# Patient Record
Sex: FEMALE | Race: BLACK OR AFRICAN AMERICAN | NOT HISPANIC OR LATINO | URBAN - METROPOLITAN AREA
[De-identification: names, ages, dates, MRNs, and addresses within clinical notes are randomized per-mention and may not be internally consistent; named-entity substitution may affect disease eponyms.]

---

## 2017-09-21 ENCOUNTER — TELEPHONE (OUTPATIENT)
Dept: MEDICAL GROUP | Facility: MEDICAL CENTER | Age: 67
End: 2017-09-21

## 2017-09-21 NOTE — TELEPHONE ENCOUNTER
ANNUAL WELLNESS VISIT PRE-VISIT PLANNING     1.  Reviewed note from last office visit with PCP: YES    2.  If any orders were placed at last visit, do we have Results/Consult Notes?        •  Labs - Labs were not ordered at last office visit.       •  Imaging - Imaging was not ordered at last office visit.       •  Referrals -

## 2017-09-21 NOTE — TELEPHONE ENCOUNTER
Left message for patient to call back regarding pre-visit planning. Please transfer call to [phone].

## 2017-09-28 ENCOUNTER — OFFICE VISIT (OUTPATIENT)
Dept: MEDICAL GROUP | Facility: MEDICAL CENTER | Age: 67
End: 2017-09-28
Payer: MEDICARE

## 2017-09-28 VITALS
HEART RATE: 70 BPM | HEIGHT: 67 IN | SYSTOLIC BLOOD PRESSURE: 112 MMHG | TEMPERATURE: 98 F | OXYGEN SATURATION: 94 % | BODY MASS INDEX: 27.65 KG/M2 | WEIGHT: 176.2 LBS | DIASTOLIC BLOOD PRESSURE: 76 MMHG

## 2017-09-28 DIAGNOSIS — E55.9 HYPOVITAMINOSIS D: ICD-10-CM

## 2017-09-28 DIAGNOSIS — Z12.11 COLON CANCER SCREENING: ICD-10-CM

## 2017-09-28 DIAGNOSIS — E78.5 DYSLIPIDEMIA: Chronic | ICD-10-CM

## 2017-09-28 DIAGNOSIS — I10 ESSENTIAL HYPERTENSION: Chronic | ICD-10-CM

## 2017-09-28 DIAGNOSIS — Z00.00 PREVENTATIVE HEALTH CARE: Chronic | ICD-10-CM

## 2017-09-28 DIAGNOSIS — Z00.00 MEDICARE ANNUAL WELLNESS VISIT, SUBSEQUENT: ICD-10-CM

## 2017-09-28 DIAGNOSIS — D12.6 TUBULAR ADENOMA OF COLON: ICD-10-CM

## 2017-09-28 PROCEDURE — G0439 PPPS, SUBSEQ VISIT: HCPCS | Performed by: INTERNAL MEDICINE

## 2017-09-28 RX ORDER — RAMIPRIL 10 MG/1
10 CAPSULE ORAL 2 TIMES DAILY
Qty: 180 CAP | Refills: 3 | Status: SHIPPED | OUTPATIENT
Start: 2017-09-28 | End: 2018-10-04 | Stop reason: SDUPTHER

## 2017-09-28 RX ORDER — ERGOCALCIFEROL 1.25 MG/1
50000 CAPSULE ORAL
Qty: 12 CAP | Refills: 3 | Status: SHIPPED | OUTPATIENT
Start: 2017-09-28 | End: 2018-10-04 | Stop reason: SDUPTHER

## 2017-09-28 RX ORDER — HYDROCHLOROTHIAZIDE 12.5 MG/1
12.5 CAPSULE, GELATIN COATED ORAL DAILY
Qty: 90 CAP | Refills: 3 | Status: SHIPPED | OUTPATIENT
Start: 2017-09-28 | End: 2018-10-04 | Stop reason: SDUPTHER

## 2017-09-28 RX ORDER — ATORVASTATIN CALCIUM 20 MG/1
20 TABLET, FILM COATED ORAL DAILY
Qty: 90 TAB | Refills: 3 | Status: SHIPPED | OUTPATIENT
Start: 2017-09-28 | End: 2018-10-04 | Stop reason: SDUPTHER

## 2017-09-28 ASSESSMENT — PATIENT HEALTH QUESTIONNAIRE - PHQ9: CLINICAL INTERPRETATION OF PHQ2 SCORE: 0

## 2017-09-28 NOTE — LETTER
[facility]  Adeel Sepulveda M.D.  [address]  [place] [address]  Fax: [phone]   Authorization for Release/Disclosure of   Protected Health Information   Name: SANDEE MARCUS : 1950 SSN: [id number]   Address: [address] [place] [address]  [place] [address] Phone:    [phone] (home)    I authorize the entity listed below to release/disclose the PHI below to:   [facility]/Adeel Sepulveda M.D. and Adeel Sepulveda M.D.   Provider or Entity Name:     Address   City, State, Zip   Phone:      Fax:     Reason for request: continuity of care   Information to be released:    [  ] LAST COLONOSCOPY,  including any PATH REPORT and follow-up  [  ] LAST FIT/COLOGUARD RESULT [  ] LAST DEXA  [ xxxx] LAST MAMMOGRAM  [xxx] LAST PAP  [  ] LAST LABS [  ] RETINA EXAM REPORT  [  ] IMMUNIZATION RECORDS  [  ] Release all info      [  ] Check here and initial the line next to each item to release ALL health information INCLUDING  _____ Care and treatment for drug and / or alcohol abuse  _____ HIV testing, infection status, or AIDS  _____ Genetic Testing    DATES OF SERVICE OR TIME PERIOD TO BE DISCLOSED: _____________  I understand and acknowledge that:  * This Authorization may be revoked at any time by you in writing, except if your health information has already been used or disclosed.  * Your health information that will be used or disclosed as a result of you signing this authorization could be re-disclosed by the recipient. If this occurs, your re-disclosed health information may no longer be protected by State or Federal laws.  * You may refuse to sign this Authorization. Your refusal will not affect your ability to obtain treatment.  * This Authorization becomes effective upon signing and will  on (date) __________.      If no date is indicated, this Authorization will  one (1) year from the signature date.    Name: Sandee Marcus    Signature:   Date:     2017            PLEASE FAX REQUESTED RECORDS BACK TO: [phone]

## 2017-09-28 NOTE — PROGRESS NOTES
No chief complaint on file.        HPI:  Sandee is a 67 y.o. here for Medicare Annual Wellness Visit  meds and allergies reviewed  Sees  eye  Sees gyn in [facility]  note reviewed  No tobacco, no etoh  No mood changes or depression  SH no tobacco, no etoh,       Patient Active Problem List    Diagnosis Date Noted   • Tubular adenoma of colon 11/01/2013   • Tear meniscus knee 08/16/2012   • Hypertension 08/22/2009   • Dyslipidemia 08/22/2009   • Family history of breast cancer 08/22/2009   • Preventative health care 08/22/2009       Current Outpatient Prescriptions   Medication Sig Dispense Refill   • hydrochlorothiazide (MICROZIDE) 12.5 MG capsule Take 1 Cap by mouth every day. 90 Cap 3   • ramipril (ALTACE) 10 MG capsule Take 1 Cap by mouth 2 Times a Day. 180 Cap 3   • atorvastatin (LIPITOR) 20 MG Tab Take 1 Tab by mouth every day. 90 Tab 3   • ergocalciferol (DRISDOL) 04828 UNIT capsule Take 1 Cap by mouth every 7 days. 12 Cap 3   • aspirin (ASA) 81 MG CHEW Take 1 Tab by mouth every day. 100 Tab 11     No current facility-administered medications for this visit.         Patient is taking medications as noted in medication list.  Current supplements as per medication list.     Allergies: Erythromycin and Norvasc [amlodipine besylate]    Current social contact/activities: pt keeps herself busy with daily activities.       Is patient current with immunizations?  Flu need  She  reports that she has never smoked. She has never used smokeless tobacco. She reports that she drinks alcohol. She reports that she does not use drugs.  Counseling given: Not Answered        DPA/Advanced directive: Patient does not have an Advanced Directive.  A packet and workshop information was given on Advanced Directives.    ROS:    Gait: Uses no assistive device   Ostomy: no    Other tubes: no    Amputations: no    Chronic oxygen use no    Last eye exam last year     Wears hearing aids: no    :  Denies incontinence.             Depression Screening    Little interest or pleasure in doing things?  0 - not at all  Feeling down, depressed, or hopeless? 0 - not at all  Patient Health Questionnaire Score: 0      Screening for Cognitive Impairment    Three Minute Recall (apple, watch, peter)  3/3    Draw clock face with all 12 numbers set to the hand to show 10 minutes past 11 o'clock  1 5/5  If cognitive concerns identified, deferred for follow up unless specifically addressed in assessment and plan.    Fall Risk Assessment    Has the patient had two or more falls in the last year or any fall with injury in the last year?  No  If fall risk identified, deferred for follow up unless specifically addressed in assessment and plan.    Safety Assessment    Throw rugs on floor.  No  Handrails on all stairs.  Yes  Good lighting in all hallways.  Yes  Difficulty hearing.  No  Patient counseled about all safety risks that were identified.    Functional Assessment ADLs    Are there any barriers preventing you from cooking for yourself or meeting nutritional needs?  No.    Are there any barriers preventing you from driving safely or obtaining transportation?  No.    Are there any barriers preventing you from using a telephone or calling for help?  No.    Are there any barriers preventing you from shopping?  No.    Are there any barriers preventing you from taking care of your own finances?  No.    Are there any barriers preventing you from managing your medications?  No.    Are you currently engaging any exercise or physical activity?  Yes.  Pt works out at home.     Health Maintenance Summary                IMM ZOSTER VACCINE Overdue 9/3/2009     BONE DENSITY Overdue 9/3/2014     Annual Wellness Visit Overdue 9/22/2016      Done 9/22/2015     PAP SMEAR Overdue 8/14/2017      Done 8/14/2014 done in [facility]     Patient has more history with this topic...    MAMMOGRAM Overdue 8/18/2017      Done 8/18/2016  [id number]     Patient has more history with this topic...    IMM INFLUENZA Overdue 9/1/2017      Done 10/7/2016 Imm Admin: Influenza Vaccine Adult HD     Patient has more history with this topic...    COLONOSCOPY Next Due 10/29/2018      Done 10/29/2013 AMB REFERRAL TO GI FOR COLONOSCOPY    IMM DTaP/Tdap/Td Vaccine Next Due 8/16/2022      Done 8/16/2012 Imm Admin: Tdap Vaccine          Patient Care Team:  Adeel Sepulveda M.D. as PCP - General  Blade Verduzco M.D. as Consulting Physician (Ophthalmology)  Helene Fox M.D. as Consulting Physician (OB/Gyn)    Social History   Substance Use Topics   • Smoking status: Never Smoker   • Smokeless tobacco: Never Used   • Alcohol use 0.0 oz/week      Comment: rare     Family History   Problem Relation Age of Onset   • Cancer Mother      breast cancer   • Diabetes Brother      She  has a past medical history of Dyslipidemia (8/22/2009); Family history of breast cancer (8/22/2009); and Hypertension (8/22/2009).   No past surgical history on file.        Tubular adenoma  10/29/13 colon per [facility] tubular adenoma, repeat in 5 yrs     Meniscus  7/12 MRI left knee  medial meniscal posterior horn/body tear      Preventive health  2011 dexa in [place] no records  8/12 tdap   8/7/13 pap per gyn  10/29/13 colon per [facility] tubular adenoma, repeat in 5 yrs  9/16/14 pneumovax   10/1/15 declines zoster vaccine  10/15/15 prevnar at [facility]  8/18/16 mammogram at [place] per  gyn in [place], right breast nodular asymmetry, further evaluation with ultrasound is recommended  9/19/16 had follow up ultrasound and biopsy negative right breast in [place] no records  dexa per gyn in [place]  9/24/16 vit d 54 on [id number] weekly  9/28/16 FIT negative  8/31/17 pap per gyn  in [place], vaginal atrophy, urethral carbuncle, cystocele, rectocele, hemorrhoid, Pap smear performed     Hypertension  1/06 exercise thallium no evidence of ischemia ejection fraction  80%  1/06 echocardiogram normal left ventricular function  8/11 on altace 10 bid, add norvasc 5 mg  7/12 stopped norvasc due to leg swelling, on altace 10 mg bid  8/12 urine mac 4.8; on altace 10 mg bid and start hctz 12.5 mg   10/6/15 urine mac <5.8 on altace 10 mg bid and hctz 12.5 mg  9/24/16 urine mac 3.5 on altace 10 mg bid and hctz 12.5 mg  (intolerant norvasc)     Family history of breast cancer  Breast cancer in mother  Patient sees gynecologist in [place] patient for annual exam and mammogram   7/11 gyn note   7/11 mammogram per gyn in [place]  8/12 mammogram in [place] negative  8/7/13 mammogram in [place]  8/16/14 mammogram done in [facility]  8/14/15 mammogram at [place] in [place] per gyn  negative  8/18/16 mammogram at [place] per  gyn in [place], right breast nodular asymmetry, further evaluation with ultrasound is recommended     Dyslipidemia  12/08 chol 162, trig 192,hdl 48,ldl 76, CRP 1  2/10 chol 143,trig 154,hdl 45,ldl 67 on lipitor 40 mg  8/11 chol 148,trig 141,hdl 52,ldl 68 on lipitor 40 mg  8/12 chol 122,trig 136,hdl 40,ldl 55, decrease lipitor to 20 mg (40 mg 1/2 per day)  9/26/13 chol 142,trig 124,hdl 50,ldl 67 on lipitor 20 mg (1/2 40 mg tab)  9/16/14 off lipitor no side effects, just wanted to stop the lipitor  9/18/14 chol 229,trig 140,hdl 46,ldl 155 off lipitor; resume lipitor 20 mg daily repeat lipid in 6 weeks  10/16/15 chol 146,trig 160,hdl 51,ldl 63 on lipitor 20 mg  9/24/16 chol 152,trig 167,hdl 51,ldl 68 on lipitor 20 mg         Exam:        Hearing fair  Dentition fair  Alert, oriented in no acute distress.  Eye contact is good, speech goal directed, affect calm  Lungs clear  Cv s1s2 reg  Ext no edema        Assessment and Plan. The following treatment and monitoring plan is recommended:     Assessment  #! Wellness    #2 Tubular adenoma last colonoscopy 10/29/13 colon per [facility] tubular adenoma, repeat in 5 yrs     #3 history of knee meniscus tear 7/12  MRI left knee  medial meniscal posterior horn/body tear , no current knee pain     #4 Hypertension stable on altace 10 mg bid and hctz 12.5 mg no lightheadedness or dizziness      #5 Dyslipidemia 9/24/16 chol 152,trig 167,hdl 51,ldl 68 on lipitor 20 mg, stable no muscle pain      #6 Preventive health  2011 dexa in [place] no records  8/12 tdap   8/7/13 pap per gyn  10/29/13 colon per [facility] tubular adenoma, repeat in 5 yrs  9/16/14 pneumovax   10/1/15 declines zoster vaccine  10/15/15 prevnar at [facility]  8/18/16 mammogram at [place] per  gyn in [place], right breast nodular asymmetry, further evaluation with ultrasound is recommended  9/19/16 had follow up ultrasound and biopsy negative right breast in [place] no records  dexa per gyn in [place]  9/24/16 vit d 54 on [id number] weekly  9/28/16 FIT negative  8/31/17 pap per gyn  in [place], vaginal atrophy, urethral carbuncle, cystocele, rectocele, hemorrhoid, Pap smear performed    #7 low vitamin D         Services suggested:    Health Care Screening recommendations as per orders if indicated.  Referrals offered: PT/OT/Nutrition counseling/Behavioral Health/Smoking cessation as per orders if indicated.    Discussion today about general wellness and lifestyle habits:    · Prevent falls and reduce trip hazards; Cautioned about securing or removing rugs.  · Have a working fire alarm and carbon monoxide detector;   · Engage in regular physical activity and social activities       Follow-up:   Plan  #! FIT card and instructions provided    #2 labs ordered      #3 had mammogram and dexa done by gyn     #4 sees gyn [place] for pap at [place] try to obtain old records     #5 health maintenance reviewed and updated    #6 she will get flu vaccine at pharmacy    #7 declines zoster vaccine    #8 declines physical therapy    #9 declines hearing test    #10 has had pneumovax and prevnar and tdap    #11 old dexa records from [place]     #12 had  mammogram 8/31/17

## 2017-09-30 ENCOUNTER — TELEPHONE (OUTPATIENT)
Dept: MEDICAL GROUP | Facility: MEDICAL CENTER | Age: 67
End: 2017-09-30

## 2017-09-30 LAB
25(OH)D3+25(OH)D2 SERPL-MCNC: 51 NG/ML (ref 30–100)
ALBUMIN SERPL-MCNC: 3.9 G/DL (ref 3.6–4.8)
ALBUMIN/CREAT UR: <2.7 MG/G CREAT (ref 0–30)
ALBUMIN/GLOB SERPL: 1.1 {RATIO} (ref 1.2–2.2)
ALP SERPL-CCNC: 88 IU/L (ref 39–117)
ALT SERPL-CCNC: 20 IU/L (ref 0–32)
APPEARANCE UR: ABNORMAL
AST SERPL-CCNC: 23 IU/L (ref 0–40)
BACTERIA #/AREA URNS HPF: NORMAL /[HPF]
BACTERIA UR CULT: NO GROWTH
BACTERIA UR CULT: NORMAL
BASOPHILS # BLD AUTO: 0 X10E3/UL (ref 0–0.2)
BASOPHILS NFR BLD AUTO: 0 %
BILIRUB SERPL-MCNC: 0.5 MG/DL (ref 0–1.2)
BILIRUB UR QL STRIP: NEGATIVE
BUN SERPL-MCNC: 13 MG/DL (ref 8–27)
BUN/CREAT SERPL: 14 (ref 12–28)
CALCIUM SERPL-MCNC: 9.7 MG/DL (ref 8.7–10.3)
CASTS URNS MICRO: NORMAL
CASTS URNS QL MICRO: NORMAL
CHLORIDE SERPL-SCNC: 100 MMOL/L (ref 96–106)
CHOLEST SERPL-MCNC: 144 MG/DL (ref 100–199)
CO2 SERPL-SCNC: 25 MMOL/L (ref 18–29)
COLOR UR: YELLOW
COMMENT 011824: NORMAL
CREAT SERPL-MCNC: 0.92 MG/DL (ref 0.57–1)
CREAT UR-MCNC: 109.4 MG/DL
CRYSTALS URNS MICRO: NORMAL
EOSINOPHIL # BLD AUTO: 0.2 X10E3/UL (ref 0–0.4)
EOSINOPHIL NFR BLD AUTO: 3 %
EPI CELLS #/AREA URNS HPF: NORMAL /HPF
ERYTHROCYTE [DISTWIDTH] IN BLOOD BY AUTOMATED COUNT: 13.6 % (ref 12.3–15.4)
GLOBULIN SER CALC-MCNC: 3.5 G/DL (ref 1.5–4.5)
GLUCOSE SERPL-MCNC: 84 MG/DL (ref 65–99)
GLUCOSE UR QL: NEGATIVE
HBA1C MFR BLD: 5.9 % (ref 4.8–5.6)
HCT VFR BLD AUTO: 38.2 % (ref 34–46.6)
HDLC SERPL-MCNC: 52 MG/DL
HGB BLD-MCNC: 12.9 G/DL (ref 11.1–15.9)
HGB UR QL STRIP: NEGATIVE
IMM GRANULOCYTES # BLD: 0 X10E3/UL (ref 0–0.1)
IMM GRANULOCYTES NFR BLD: 0 %
IMMATURE CELLS  115398: NORMAL
KETONES UR QL STRIP: NEGATIVE
LDLC SERPL CALC-MCNC: 64 MG/DL (ref 0–99)
LEUKOCYTE ESTERASE UR QL STRIP: ABNORMAL
LYMPHOCYTES # BLD AUTO: 1.7 X10E3/UL (ref 0.7–3.1)
LYMPHOCYTES NFR BLD AUTO: 28 %
MCH RBC QN AUTO: 28.7 PG (ref 26.6–33)
MCHC RBC AUTO-ENTMCNC: 33.8 G/DL (ref 31.5–35.7)
MCV RBC AUTO: 85 FL (ref 79–97)
MICRO URNS: ABNORMAL
MICRO URNS: ABNORMAL
MICROALBUMIN UR-MCNC: <3 UG/ML
MONOCYTES # BLD AUTO: 0.5 X10E3/UL (ref 0.1–0.9)
MONOCYTES NFR BLD AUTO: 9 %
MORPHOLOGY BLD-IMP: NORMAL
MUCOUS THREADS URNS QL MICRO: PRESENT
NEUTROPHILS # BLD AUTO: 3.5 X10E3/UL (ref 1.4–7)
NEUTROPHILS NFR BLD AUTO: 60 %
NITRITE UR QL STRIP: NEGATIVE
NRBC BLD AUTO-RTO: NORMAL %
PH UR STRIP: 6 [PH] (ref 5–7.5)
PLATELET # BLD AUTO: 270 X10E3/UL (ref 150–379)
POTASSIUM SERPL-SCNC: 4.2 MMOL/L (ref 3.5–5.2)
PROT SERPL-MCNC: 7.4 G/DL (ref 6–8.5)
PROT UR QL STRIP: NEGATIVE
RBC # BLD AUTO: 4.5 X10E6/UL (ref 3.77–5.28)
RBC #/AREA URNS HPF: NORMAL /HPF
RENAL EPI CELLS #/AREA URNS HPF: NORMAL /[HPF]
SODIUM SERPL-SCNC: 139 MMOL/L (ref 134–144)
SP GR UR: 1.02 (ref 1–1.03)
T VAGINALIS URNS QL MICRO: NORMAL
TRIGL SERPL-MCNC: 138 MG/DL (ref 0–149)
TSH SERPL DL<=0.005 MIU/L-ACNC: 1.81 UIU/ML (ref 0.45–4.5)
UNIDENT CRYS URNS QL MICRO: NORMAL
URINALYSIS REFLEX  377202: ABNORMAL
URNS CMNT MICRO: NORMAL
UROBILINOGEN UR STRIP-MCNC: 0.2 MG/DL (ref 0.2–1)
VLDLC SERPL CALC-MCNC: 28 MG/DL (ref 5–40)
WBC # BLD AUTO: 5.9 X10E3/UL (ref 3.4–10.8)
WBC #/AREA URNS HPF: NORMAL /HPF
YEAST #/AREA URNS HPF: NORMAL /[HPF]

## 2017-10-03 LAB — HEMOCCULT STL QL IA: NEGATIVE

## 2018-10-04 ENCOUNTER — TELEPHONE (OUTPATIENT)
Dept: MEDICAL GROUP | Facility: MEDICAL CENTER | Age: 68
End: 2018-10-04

## 2018-10-04 ENCOUNTER — OFFICE VISIT (OUTPATIENT)
Dept: MEDICAL GROUP | Facility: MEDICAL CENTER | Age: 68
End: 2018-10-04
Payer: MEDICARE

## 2018-10-04 VITALS
SYSTOLIC BLOOD PRESSURE: 126 MMHG | BODY MASS INDEX: 28.45 KG/M2 | TEMPERATURE: 98.3 F | HEART RATE: 83 BPM | HEIGHT: 66 IN | WEIGHT: 177 LBS | DIASTOLIC BLOOD PRESSURE: 74 MMHG | OXYGEN SATURATION: 98 %

## 2018-10-04 DIAGNOSIS — R79.89 LOW VITAMIN D LEVEL: ICD-10-CM

## 2018-10-04 DIAGNOSIS — E55.9 HYPOVITAMINOSIS D: ICD-10-CM

## 2018-10-04 DIAGNOSIS — Z12.11 COLON CANCER SCREENING: ICD-10-CM

## 2018-10-04 DIAGNOSIS — Z12.31 ENCOUNTER FOR SCREENING MAMMOGRAM FOR BREAST CANCER: ICD-10-CM

## 2018-10-04 DIAGNOSIS — M81.0 POSTMENOPAUSAL BONE LOSS: ICD-10-CM

## 2018-10-04 DIAGNOSIS — Z00.00 PREVENTATIVE HEALTH CARE: Chronic | ICD-10-CM

## 2018-10-04 DIAGNOSIS — R73.09 IMPAIRED GLUCOSE METABOLISM: ICD-10-CM

## 2018-10-04 DIAGNOSIS — E78.5 DYSLIPIDEMIA: Chronic | ICD-10-CM

## 2018-10-04 DIAGNOSIS — I10 ESSENTIAL HYPERTENSION: Chronic | ICD-10-CM

## 2018-10-04 DIAGNOSIS — Z00.00 MEDICARE ANNUAL WELLNESS VISIT, SUBSEQUENT: ICD-10-CM

## 2018-10-04 PROCEDURE — G0439 PPPS, SUBSEQ VISIT: HCPCS | Performed by: INTERNAL MEDICINE

## 2018-10-04 RX ORDER — HYDROCHLOROTHIAZIDE 12.5 MG/1
12.5 CAPSULE, GELATIN COATED ORAL DAILY
Qty: 90 CAP | Refills: 3 | Status: SHIPPED | OUTPATIENT
Start: 2018-10-04 | End: 2019-10-14 | Stop reason: SDUPTHER

## 2018-10-04 RX ORDER — ATORVASTATIN CALCIUM 20 MG/1
20 TABLET, FILM COATED ORAL DAILY
Qty: 90 TAB | Refills: 3 | Status: SHIPPED | OUTPATIENT
Start: 2018-10-04 | End: 2019-10-14 | Stop reason: SDUPTHER

## 2018-10-04 RX ORDER — RAMIPRIL 10 MG/1
10 CAPSULE ORAL 2 TIMES DAILY
Qty: 180 CAP | Refills: 3 | Status: SHIPPED | OUTPATIENT
Start: 2018-10-04 | End: 2019-10-14 | Stop reason: SDUPTHER

## 2018-10-04 RX ORDER — ERGOCALCIFEROL 1.25 MG/1
50000 CAPSULE ORAL
Qty: 12 CAP | Refills: 3 | Status: SHIPPED | OUTPATIENT
Start: 2018-10-04 | End: 2020-10-23

## 2018-10-04 ASSESSMENT — ACTIVITIES OF DAILY LIVING (ADL): BATHING_REQUIRES_ASSISTANCE: 0

## 2018-10-04 ASSESSMENT — ENCOUNTER SYMPTOMS: GENERAL WELL-BEING: EXCELLENT

## 2018-10-04 ASSESSMENT — PATIENT HEALTH QUESTIONNAIRE - PHQ9: CLINICAL INTERPRETATION OF PHQ2 SCORE: 0

## 2018-10-04 NOTE — PROGRESS NOTES
Subjective:      Sandee Grimm is a 68 y.o. female who presents wellness assessment          HPI     CC:  Health Risk Assessment Exam.  HPI: Wellness assessment  Sandee is a 68 y.o. here for Health Risk Assessment.   PCP: Adeel Sepulveda M.D.  Other specialists: sees gynecology in [place]   Ophthal: no recent eye exam   SH , no tobacco, no etoh, video cardio work out five days per week 30 minutes, tries to eat clean avoids processed foods   FH reviewed one brother head and neck cancer       Patient Active Problem List    Diagnosis Date Noted   • Tubular adenoma of colon 11/01/2013   • Tear meniscus knee 08/16/2012   • Hypertension 08/22/2009   • Dyslipidemia 08/22/2009   • Family history of breast cancer 08/22/2009   • Preventative health care 08/22/2009     Current Outpatient Prescriptions   Medication Sig Dispense Refill   • hydrochlorothiazide (MICROZIDE) 12.5 MG capsule Take 1 Cap by mouth every day. 90 Cap 3   • ramipril (ALTACE) 10 MG capsule Take 1 Cap by mouth 2 Times a Day. 180 Cap 3   • atorvastatin (LIPITOR) 20 MG Tab Take 1 Tab by mouth every day. 90 Tab 3   • ergocalciferol (DRISDOL) 59186 UNIT capsule Take 1 Cap by mouth every 7 days. 12 Cap 3   • aspirin (ASA) 81 MG CHEW Take 1 Tab by mouth every day. 100 Tab 11     No current facility-administered medications for this visit.       Current supplements: reviewed  Chronic narcotic pain medicines: no  Allergies: Erythromycin and Norvasc [amlodipine besylate]    Screening:reviewed  Depressive Symptoms: Denies feeling down, depressed or hopeless. Denies loss of interest or pleasure in doing things   ADLs: Denies needing help with using telephone, transportation, shopping, preparing meals, housework, laundry, or managing medication or money.    Independent with bathing, hygiene, feeding, toileting, dressing    Memory concerns: Denies difficulty remembering details of conversations, events and upcoming appointments.  Hearing problems:  Denies.   Recent falls no    Current social contact/activities: reviewed  Social History   Substance Use Topics   • Smoking status: Never Smoker   • Smokeless tobacco: Never Used   • Alcohol use 0.0 oz/week      Comment: rare       Family History   Problem Relation Age of Onset   • Cancer Mother         breast cancer   • Diabetes Brother      History reviewed. No pertinent surgical history.    ostomy or other tubes or amputations: no  Chronic oxygen use no   Gait: normal. Uses assistive device : no  Health Care Screening recommendations reviewed with patient today and updated or ordered.  DPA/Advanced directive: Completed/Information provided.    Referrals for PT/OT/Nutrition counseling/Behavioral Health/Smoking cessation as above if indicated  Discussion today about general wellness and lifestyle habits:    · Prevent falls and reduce trip hazards;   · Have a working fire alarm and carbon monoxide detector;   · Engage in regular physical activity and social activities;   · Use sun protection when outdoors.       Current Outpatient Prescriptions   Medication Sig Dispense Refill   • hydrochlorothiazide (MICROZIDE) 12.5 MG capsule Take 1 Cap by mouth every day. 90 Cap 3   • ramipril (ALTACE) 10 MG capsule Take 1 Cap by mouth 2 Times a Day. 180 Cap 3   • atorvastatin (LIPITOR) 20 MG Tab Take 1 Tab by mouth every day. 90 Tab 3   • ergocalciferol (DRISDOL) 73935 UNIT capsule Take 1 Cap by mouth every 7 days. 12 Cap 3   • aspirin (ASA) 81 MG CHEW Take 1 Tab by mouth every day. 100 Tab 11     No current facility-administered medications for this visit.      Tubular adenoma  10/29/13 colon per [facility] tubular adenoma, repeat in 5 yrs     Meniscus  7/12 MRI left knee  medial meniscal posterior horn/body tear      Preventive health  2011 dexa in [place] no records  8/12 tdap   10/29/13 colon per [facility] tubular adenoma, repeat in 5 yrs  9/16/14 pneumovax   10[facility]  8/31/17 pap per gyn  in [place],  vaginal atrophy, urethral carbuncle, cystocele, rectocele, hemorrhoid, Pap smear performed  8/31/17 mammogram at [place]  9/28/17 declines zoster  9/30/17 vit d 51 on [id number] weekly  10/3/17 FIT negative  dexa by gyn at [place]     Hypertension  1/06 exercise thallium no evidence of ischemia ejection fraction 80%  1/06 echocardiogram normal left ventricular function  8/11 on altace 10 bid, add norvasc 5 mg  7/12 stopped norvasc due to leg swelling, on altace 10 mg bid  8/12 urine mac 4.8; on altace 10 mg bid and start hctz 12.5 mg   10/6/15 urine mac <5.8 on altace 10 mg bid and hctz 12.5 mg  9/24/16 urine mac 3.5 on altace 10 mg bid and hctz 12.5 mg  9/30/17 urine mac <2.7 on altace 10 mg bid and hctz 12.5 mg  (intolerant norvasc)     Family history of breast cancer  Breast cancer in mother  Patient sees gynecologist in [place] patient for annual exam and mammogram   7/11 gyn note   7/11 mammogram per gyn in [place]  8/12 mammogram in [place] negative  8/7/13 mammogram in [place]  8/16/14 mammogram done in [facility]  8/14/15 mammogram at [place] in [place] per gyn  negative  8/18/16 mammogram at [place] per  gyn in [place], right breast nodular asymmetry, further evaluation with ultrasound is recommended     Dyslipidemia  12/08 chol 162, trig 192,hdl 48,ldl 76, CRP 1  2/10 chol 143,trig 154,hdl 45,ldl 67 on lipitor 40 mg  8/11 chol 148,trig 141,hdl 52,ldl 68 on lipitor 40 mg  8/12 chol 122,trig 136,hdl 40,ldl 55, decrease lipitor to 20 mg (40 mg 1/2 per day)  9/26/13 chol 142,trig 124,hdl 50,ldl 67 on lipitor 20 mg (1/2 40 mg tab)  9/16/14 off lipitor no side effects, just wanted to stop the lipitor  9/18/14 chol 229,trig 140,hdl 46,ldl 155 off lipitor; resume lipitor 20 mg daily repeat lipid in 6 weeks  10/16/15 chol 146,trig 160,hdl 51,ldl 63 on lipitor 20 mg  9/24/16 chol 152,trig 167,hdl 51,ldl 68 on lipitor 20 mg  9/30/17 chol 144,trig 138,hdl 52,ldl 64 on lipitor 20 mg  Patient  Active Problem List   Diagnosis   • Hypertension   • Dyslipidemia   • Family history of breast cancer   • Preventative health care   • Tear meniscus knee   • Tubular adenoma of colon       Depression Screening    Little interest or pleasure in doing things?  0 - not at all  Feeling down, depressed , or hopeless? 0 - not at all  Patient Health Questionnaire Score: 0     If depressive symptoms identified deferred to follow up visit unless specifically addressed in assessment and plan.    Interpretation of PHQ-9 Total Score   Score Severity   1-4 No Depression   5-9 Mild Depression   10-14 Moderate Depression   15-19 Moderately Severe Depression   20-27 Severe Depression    Screening for Cognitive Impairment    Three Minute Recall (leader, season, table) 2/3    Jeovany clock face with all 12 numbers and set the hands to show 10 past 11.       Cognitive concerns identified deferred for follow up unless specifically addressed in assessment and plan.    Fall Risk Assessment    Has the patient had two or more falls in the last year or any fall with injury in the last year?  No    Safety Assessment    Throw rugs on floor.  Yes  Handrails on all stairs.  No  Good lighting in all hallways.  Yes  Difficulty hearing.  No  Patient counseled about all safety risks that were identified.    Functional Assessment ADLs    Are there any barriers preventing you from cooking for yourself or meeting nutritional needs?  No.    Are there any barriers preventing you from driving safely or obtaining transportation?  No.    Are there any barriers preventing you from using a telephone or calling for help?  No.    Are there any barriers preventing you from shopping?  No.    Are there any barriers preventing you from taking care of your own finances?  No.    Are there any barriers preventing you from managing your medications?  No.    Are there any barriers preventing you from showering, bathing or dressing yourself?  No.    Are you currently  engaging in any exercise or physical activity?  Yes.     What is your perception of your health?  Excellent.      Health Maintenance Summary                IMM ZOSTER VACCINES Overdue 9/3/1999     BONE DENSITY Overdue 9/3/2014     PAP SMEAR Overdue 8/14/2017      Done 8/14/2014 done in [facility]     Patient has more history with this topic...    MAMMOGRAM Overdue 8/18/2017      Done 8/18/2016 [id number]     Patient has more history with this topic...    IMM INFLUENZA Overdue 9/1/2018      Done 10/7/2016 Imm Admin: Influenza Vaccine Adult HD     Patient has more history with this topic...    Annual Wellness Visit Overdue 9/29/2018      Done 9/28/2017 Visit Dx: Medicare annual wellness visit, subsequent     Patient has more history with this topic...    COLONOSCOPY Next Due 10/29/2018      Done 10/29/2013 AMB REFERRAL TO GI FOR COLONOSCOPY    IMM DTaP/Tdap/Td Vaccine Next Due 8/16/2022      Done 8/16/2012 Imm Admin: Tdap Vaccine          Patient Care Team:  Adeel Sepulveda M.D. as PCP - General  Blade Verduzco M.D. as Consulting Physician (Ophthalmology)  Helene Fox M.D. as Consulting Physician (OB/Gyn)    ROS       Objective:          Physical Exam   Constitutional: She appears well-developed and well-nourished. No distress.   HENT:   Head: Normocephalic and atraumatic.   Eyes: Conjunctivae are normal. Right eye exhibits no discharge. Left eye exhibits no discharge.   Neck: Neck supple.   Cardiovascular: Normal rate and regular rhythm.    Pulmonary/Chest: Effort normal and breath sounds normal.   Abdominal: She exhibits no distension.   Neurological: She is alert.   Skin: Skin is warm. She is not diaphoretic.   Psychiatric: She has a normal mood and affect. Her behavior is normal.   Nursing note and vitals reviewed.              Assessment/Plan:     Assessment  #! Wellness assessment    #2 Tubular adenoma 10/29/13 colon per [facility] tubular adenoma, repeat in 5 yrs     #3 Hypertension on altace 10 mg  bid and hctz 12.5 mg blood pressure stable and controlled     #4 Dyslipidemia 9/30/17 chol 144,trig 138,hdl 52,ldl 64 on lipitor 20 mg, no muscle pain or aches    #5 Preventive health  2011 dexa in [place] no records  8/12 tdap   [facility], repeat in 5 yrs  9/16/14 pneumovax   10/15/15 prevnar at [facility]  8/31/17 pap per gyn  in [place], vaginal atrophy, urethral carbuncle, cystocele, rectocele, hemorrhoid, Pap smear performed  8/31/17 mammogram at [place]  9/28/17 declines zoster  9/30/17 vit d 51 on [id number] weekly  10/3/17 FIT negative  dexa by gyn at [place]    #6 low vit d     Plan  #! Mammogram    #2 dexa    #3 has had flu vaccine at [facility]     #4 labs    #5 declines hearing test    #6 declines PT     #7 colon cancer screening history of adenoma    #8 no further Pap smear    #9 has had pneumococcal 13, pneumococcal 23,tdap    #10 declines shingles vaccine    #11 check blood pressure periodically and record    #12 nutrition, diet, exercise discussed    #13 follow-up 1 year

## 2018-10-07 ENCOUNTER — TELEPHONE (OUTPATIENT)
Dept: MEDICAL GROUP | Facility: MEDICAL CENTER | Age: 68
End: 2018-10-07

## 2018-10-07 DIAGNOSIS — R31.29 MICROSCOPIC HEMATURIA: ICD-10-CM

## 2018-10-07 LAB
25(OH)D3+25(OH)D2 SERPL-MCNC: 49.8 NG/ML (ref 30–100)
ALBUMIN SERPL-MCNC: 3.8 G/DL (ref 3.6–4.8)
ALBUMIN/GLOB SERPL: 1.2 {RATIO} (ref 1.2–2.2)
ALP SERPL-CCNC: 89 IU/L (ref 39–117)
ALT SERPL-CCNC: 18 IU/L (ref 0–32)
APPEARANCE UR: CLEAR
AST SERPL-CCNC: 20 IU/L (ref 0–40)
BACTERIA #/AREA URNS HPF: ABNORMAL /[HPF]
BACTERIA UR CULT: NO GROWTH
BACTERIA UR CULT: NORMAL
BASOPHILS # BLD AUTO: 0.1 X10E3/UL (ref 0–0.2)
BASOPHILS NFR BLD AUTO: 1 %
BILIRUB SERPL-MCNC: 0.4 MG/DL (ref 0–1.2)
BILIRUB UR QL STRIP: NEGATIVE
BUN SERPL-MCNC: 12 MG/DL (ref 8–27)
BUN/CREAT SERPL: 12 (ref 12–28)
CALCIUM SERPL-MCNC: 9.6 MG/DL (ref 8.7–10.3)
CASTS URNS MICRO: ABNORMAL
CASTS URNS QL MICRO: ABNORMAL
CHLORIDE SERPL-SCNC: 104 MMOL/L (ref 96–106)
CHOLEST SERPL-MCNC: 130 MG/DL (ref 100–199)
CO2 SERPL-SCNC: 22 MMOL/L (ref 20–29)
COLOR UR: YELLOW
CREAT SERPL-MCNC: 0.99 MG/DL (ref 0.57–1)
CRYSTALS URNS MICRO: ABNORMAL
EOSINOPHIL # BLD AUTO: 0.2 X10E3/UL (ref 0–0.4)
EOSINOPHIL NFR BLD AUTO: 4 %
EPI CELLS #/AREA URNS HPF: >10 /HPF
ERYTHROCYTE [DISTWIDTH] IN BLOOD BY AUTOMATED COUNT: 14 % (ref 12.3–15.4)
GLOBULIN SER CALC-MCNC: 3.3 G/DL (ref 1.5–4.5)
GLUCOSE SERPL-MCNC: 82 MG/DL (ref 65–99)
GLUCOSE UR QL: NEGATIVE
HBA1C MFR BLD: 5.8 % (ref 4.8–5.6)
HCT VFR BLD AUTO: 37.5 % (ref 34–46.6)
HDLC SERPL-MCNC: 47 MG/DL
HGB BLD-MCNC: 12.9 G/DL (ref 11.1–15.9)
HGB UR QL STRIP: NEGATIVE
IF AFRICAN AMERICAN  100797: 67 ML/MIN/1.73
IF NON AFRICAN AMER 100791: 58 ML/MIN/1.73
IMM GRANULOCYTES # BLD: 0 X10E3/UL (ref 0–0.1)
IMM GRANULOCYTES NFR BLD: 0 %
IMMATURE CELLS  115398: NORMAL
KETONES UR QL STRIP: NEGATIVE
LABORATORY COMMENT REPORT: NORMAL
LDLC SERPL CALC-MCNC: 54 MG/DL (ref 0–99)
LEUKOCYTE ESTERASE UR QL STRIP: ABNORMAL
LYMPHOCYTES # BLD AUTO: 2.3 X10E3/UL (ref 0.7–3.1)
LYMPHOCYTES NFR BLD AUTO: 38 %
MCH RBC QN AUTO: 29.4 PG (ref 26.6–33)
MCHC RBC AUTO-ENTMCNC: 34.4 G/DL (ref 31.5–35.7)
MCV RBC AUTO: 85 FL (ref 79–97)
MICRO URNS: ABNORMAL
MICRO URNS: ABNORMAL
MONOCYTES # BLD AUTO: 0.6 X10E3/UL (ref 0.1–0.9)
MONOCYTES NFR BLD AUTO: 9 %
MORPHOLOGY BLD-IMP: NORMAL
MUCOUS THREADS URNS QL MICRO: PRESENT
NEUTROPHILS # BLD AUTO: 2.9 X10E3/UL (ref 1.4–7)
NEUTROPHILS NFR BLD AUTO: 48 %
NITRITE UR QL STRIP: NEGATIVE
NRBC BLD AUTO-RTO: NORMAL %
PH UR STRIP: 5.5 [PH] (ref 5–7.5)
PLATELET # BLD AUTO: 242 X10E3/UL (ref 150–379)
POTASSIUM SERPL-SCNC: 4.2 MMOL/L (ref 3.5–5.2)
PROT SERPL-MCNC: 7.1 G/DL (ref 6–8.5)
PROT UR QL STRIP: NEGATIVE
RBC # BLD AUTO: 4.39 X10E6/UL (ref 3.77–5.28)
RBC #/AREA URNS HPF: ABNORMAL /HPF
RENAL EPI CELLS #/AREA URNS HPF: ABNORMAL /[HPF]
SODIUM SERPL-SCNC: 139 MMOL/L (ref 134–144)
SP GR UR: 1.02 (ref 1–1.03)
T VAGINALIS URNS QL MICRO: ABNORMAL
TRIGL SERPL-MCNC: 144 MG/DL (ref 0–149)
TSH SERPL DL<=0.005 MIU/L-ACNC: 2.58 UIU/ML (ref 0.45–4.5)
UNIDENT CRYS URNS QL MICRO: ABNORMAL
URINALYSIS REFLEX  377202: ABNORMAL
URNS CMNT MICRO: ABNORMAL
UROBILINOGEN UR STRIP-MCNC: 0.2 MG/DL (ref 0.2–1)
VLDLC SERPL CALC-MCNC: 29 MG/DL (ref 5–40)
WBC # BLD AUTO: 6.1 X10E3/UL (ref 3.4–10.8)
WBC #/AREA URNS HPF: ABNORMAL /HPF
YEAST #/AREA URNS HPF: ABNORMAL /[HPF]

## 2018-10-08 NOTE — TELEPHONE ENCOUNTER
Notified with labs, microscopic hematuria, no infection culture negative, repeat urinalysis 2 weeks, no change of the medications

## 2018-10-19 ENCOUNTER — HOSPITAL ENCOUNTER (OUTPATIENT)
Dept: RADIOLOGY | Facility: MEDICAL CENTER | Age: 68
End: 2018-10-19
Attending: INTERNAL MEDICINE
Payer: MEDICARE

## 2018-10-19 ENCOUNTER — TELEPHONE (OUTPATIENT)
Dept: MEDICAL GROUP | Facility: MEDICAL CENTER | Age: 68
End: 2018-10-19

## 2018-10-19 DIAGNOSIS — M81.0 POSTMENOPAUSAL BONE LOSS: ICD-10-CM

## 2018-10-19 DIAGNOSIS — Z12.31 ENCOUNTER FOR SCREENING MAMMOGRAM FOR BREAST CANCER: ICD-10-CM

## 2018-10-19 PROCEDURE — 77080 DXA BONE DENSITY AXIAL: CPT

## 2018-10-19 PROCEDURE — 77067 SCR MAMMO BI INCL CAD: CPT

## 2018-10-20 NOTE — TELEPHONE ENCOUNTER
Please notify patient her bone density shows normal bone strength at her hip, slightly decreased bone strength of her back but she does not have osteoporosis.    Continue vitamin D supplementation, regular weightbearing exercise, we can repeat her bone density test in 2 years

## 2018-10-22 ENCOUNTER — TELEPHONE (OUTPATIENT)
Dept: MEDICAL GROUP | Facility: MEDICAL CENTER | Age: 68
End: 2018-10-22

## 2018-10-22 NOTE — TELEPHONE ENCOUNTER
Phone Number Called: [phone] (home)       Message: Patient notified of results.       Left Message for patient to call back: N\A

## 2018-10-22 NOTE — TELEPHONE ENCOUNTER
----- Message from Adeel Sepulveda M.D. sent at 10/19/2018  5:51 PM PDT -----  Please notify patient her bone density shows normal bone strength at her hip, slightly decreased bone strength of her back but she does not have osteoporosis.    Continue vitamin D supplementation, regular weightbearing exercise, we can repeat her bone density test in 2 years

## 2018-11-01 ENCOUNTER — HOSPITAL ENCOUNTER (OUTPATIENT)
Dept: RADIOLOGY | Facility: MEDICAL CENTER | Age: 68
End: 2018-11-01

## 2019-01-11 ENCOUNTER — TELEPHONE (OUTPATIENT)
Dept: MEDICAL GROUP | Facility: MEDICAL CENTER | Age: 69
End: 2019-01-11

## 2019-01-12 NOTE — TELEPHONE ENCOUNTER
Please call [facility]  Patient had laboratory testing done on 10/5/18, she had a vitamin D ordered that was not covered.  I had the appropriate code of low vitamin D as a diagnosis (R79.89)     Please check to see if they can resubmit that code, it should cover the laboratory testing that was done.    The paperwork is on the counter.  Please notify the patient that we received her laboratory bill and the appropriate code was indicated on the order form, we are checking with [facility] about the charge.

## 2019-01-15 NOTE — TELEPHONE ENCOUNTER
[facility] States that she will resubmit with this code (R79.89) as a primary code because it was previously submitted with it as a secondary code.

## 2019-10-14 ENCOUNTER — OFFICE VISIT (OUTPATIENT)
Dept: MEDICAL GROUP | Facility: MEDICAL CENTER | Age: 69
End: 2019-10-14
Payer: MEDICARE

## 2019-10-14 VITALS
HEART RATE: 87 BPM | WEIGHT: 177.4 LBS | HEIGHT: 67 IN | SYSTOLIC BLOOD PRESSURE: 126 MMHG | TEMPERATURE: 97.3 F | BODY MASS INDEX: 27.84 KG/M2 | DIASTOLIC BLOOD PRESSURE: 60 MMHG | OXYGEN SATURATION: 96 %

## 2019-10-14 DIAGNOSIS — Z11.59 NEED FOR HEPATITIS C SCREENING TEST: ICD-10-CM

## 2019-10-14 DIAGNOSIS — E78.5 DYSLIPIDEMIA: ICD-10-CM

## 2019-10-14 DIAGNOSIS — I10 ESSENTIAL HYPERTENSION: ICD-10-CM

## 2019-10-14 DIAGNOSIS — Z00.00 MEDICARE ANNUAL WELLNESS VISIT, SUBSEQUENT: ICD-10-CM

## 2019-10-14 DIAGNOSIS — E55.9 HYPOVITAMINOSIS D: ICD-10-CM

## 2019-10-14 DIAGNOSIS — Z12.11 COLON CANCER SCREENING: ICD-10-CM

## 2019-10-14 PROCEDURE — G0439 PPPS, SUBSEQ VISIT: HCPCS | Performed by: INTERNAL MEDICINE

## 2019-10-14 RX ORDER — RAMIPRIL 10 MG/1
10 CAPSULE ORAL 2 TIMES DAILY
Qty: 180 CAP | Refills: 3 | Status: SHIPPED | OUTPATIENT
Start: 2019-10-14 | End: 2020-10-23 | Stop reason: SDUPTHER

## 2019-10-14 RX ORDER — ATORVASTATIN CALCIUM 20 MG/1
20 TABLET, FILM COATED ORAL DAILY
Qty: 90 TAB | Refills: 3 | Status: SHIPPED | OUTPATIENT
Start: 2019-10-14 | End: 2020-10-23 | Stop reason: SDUPTHER

## 2019-10-14 RX ORDER — HYDROCHLOROTHIAZIDE 12.5 MG/1
12.5 CAPSULE, GELATIN COATED ORAL DAILY
Qty: 90 CAP | Refills: 3 | Status: SHIPPED | OUTPATIENT
Start: 2019-10-14 | End: 2020-10-23 | Stop reason: SDUPTHER

## 2019-10-14 ASSESSMENT — ENCOUNTER SYMPTOMS
COUGH: 0
SHORTNESS OF BREATH: 0
BACK PAIN: 0
WEIGHT LOSS: 0
INSOMNIA: 0
PALPITATIONS: 0
ABDOMINAL PAIN: 0
HEARTBURN: 0
DIARRHEA: 0
DEPRESSION: 0
NERVOUS/ANXIOUS: 0
DOUBLE VISION: 0
BLURRED VISION: 0

## 2019-10-14 ASSESSMENT — PATIENT HEALTH QUESTIONNAIRE - PHQ9: CLINICAL INTERPRETATION OF PHQ2 SCORE: 0

## 2019-10-14 NOTE — PROGRESS NOTES
Subjective:      Sandee Grimm is a 69 y.o. female who presents with medicare wellness visit            HPI   CC:  Health Risk Assessment Exam.  Medication, allergies, medical history, surgical history, social history, family history reviewed and updated  HPI:wellness visit   Sandee is a 69 y.o. here for Health Risk Assessment.     PCP: Adeel Sepulveda M.D.  GI:[facility]  Gyn  in [facility]  Ophthal:[facility]   SH no tobacco, no etoh, , exercises by doing 45 minutes cardio tapes  FH no changes     Patient Active Problem List    Diagnosis Date Noted   • Tubular adenoma of colon 11/01/2013   • Tear meniscus knee 08/16/2012   • Hypertension 08/22/2009   • Dyslipidemia 08/22/2009   • Family history of breast cancer 08/22/2009   • Preventative health care 08/22/2009     Tubular adenoma  10/29/13 colon per [facility] tubular adenoma, repeat in 5 yrs     Meniscus  7/12 MRI left knee  medial meniscal posterior horn/body tear      Preventive health  8/12 tdap   10/29/13 colon per [facility] tubular adenoma, repeat in 5 yrs  9/16/14 pneumovax   10/15/15 [facility]nar at [facility]  8/31/17 pap per gyn  in [place], vaginal atrophy, urethral carbuncle, cystocele, rectocele, hemorrhoid, pap smear performed  10/6/8 vit d 50 on 11570 weekly  10/19/18 dexa LS-1.7,hip-0.5  10/29/18 mammogram     Hypertension  1/06 exercise thallium no evidence of ischemia ejection fraction 80%  1/06 echocardiogram normal left ventricular function  8/11 on altace 10 bid, add norvasc 5 mg  7/12 stopped norvasc due to leg swelling, on altace 10 mg bid  8/12 urine mac 4.8; on altace 10 mg bid and start hctz 12.5 mg   10/6/15 urine mac <5.8 on altace 10 mg bid and hctz 12.5 mg  9/24/16 urine mac 3.5 on altace 10 mg bid and hctz 12.5 mg  9/30/17 urine mac <2.7 on altace 10 mg bid and hctz 12.5 mg  (intolerant norvasc)     Dyslipidemia  12/08 chol 162, trig 192,hdl 48,ldl 76, CRP 1  2/10 chol 143,trig 154,hdl 45,ldl 67 on lipitor 40  mg  8/11 chol 148,trig 141,hdl 52,ldl 68 on lipitor 40 mg  8/12 chol 122,trig 136,hdl 40,ldl 55, decrease lipitor to 20 mg (40 mg 1/2 per day)  9/26/13 chol 142,trig 124,hdl 50,ldl 67 on lipitor 20 mg (1/2 40 mg tab)  9/16/14 off lipitor no side effects, just wanted to stop the lipitor  9/18/14 chol 229,trig 140,hdl 46,ldl 155 off lipitor; resume lipitor 20 mg daily repeat lipid in 6 weeks  10/16/15 chol 146,trig 160,hdl 51,ldl 63 on lipitor 20 mg  9/24/16 chol 152,trig 167,hdl 51,ldl 68 on lipitor 20 mg  9/30/17 chol 144,trig 138,hdl 52,ldl 64 on lipitor 20 mg  10/6/18 chol 30,trg 144,hdl 47,ldl 54 on lipitor 20 mg              Current Outpatient Medications   Medication Sig Dispense Refill   • hydrochlorothiazide (MICROZIDE) 12.5 MG capsule Take 1 Cap by mouth every day. 90 Cap 3   • ramipril (ALTACE) 10 MG capsule Take 1 Cap by mouth 2 Times a Day. 180 Cap 3   • atorvastatin (LIPITOR) 20 MG Tab Take 1 Tab by mouth every day. 90 Tab 3   • ergocalciferol (DRISDOL) 52874 UNIT capsule Take 1 Cap by mouth every 7 days. 12 Cap 3   • aspirin (ASA) 81 MG CHEW Take 1 Tab by mouth every day. 100 Tab 11     No current facility-administered medications for this visit.       Current supplements: reviewed  Chronic narcotic pain medicines: no  Allergies: Erythromycin and Norvasc [amlodipine besylate]    Screening:reviewed  Depressive Symptoms: Denies feeling down, depressed or hopeless. Denies loss of interest or pleasure in doing things   ADLs: Denies needing help with using telephone, transportation, shopping, preparing meals, housework, laundry, or managing medication or money.    Independent with bathing, hygiene, feeding, toileting, dressing   Memory concerns: Denies difficulty remembering details of conversations, events and upcoming appointments.  Hearing problems: Denies.   Recent falls no     Current social contact/activities: reviewed     Social History     Tobacco Use   • Smoking status: Never Smoker   • Smokeless  "tobacco: Never Used   Substance Use Topics   • Alcohol use: Yes     Alcohol/week: 0.0 oz     Comment: rare   • Drug use: No       Family History   Problem Relation Age of Onset   • Cancer Mother 68        breast cancer   • Diabetes Brother    • Cancer Sister 44        breast     No past surgical history on file.       Ostomy or other tubes or amputations: no  Chronic oxygen use no          /60 (BP Location: Right arm, Patient Position: Sitting, BP Cuff Size: Adult)   Pulse 87   Temp 36.3 °C (97.3 °F) (Temporal)   Ht 1.702 m (5' 7\")   Wt 80.5 kg (177 lb 6.4 oz)   SpO2 96%   BMI 27.78 kg/m²  Body mass index is 27.78 kg/m².       Gait no problems     Health Care Screening recommendations reviewed with patient today and updated or ordered.  DPA/Advanced directive:  Has power  paperwork but has not completed  Referrals for PT/OT/Nutrition counseling/Behavioral Health/Smoking cessation as above if indicated  Discussion today about general wellness and lifestyle habits:    · Prevent falls and reduce trip hazards  · Have a working fire alarm and carbon monoxide detector does not have  · Engage in regular physical activity and social activities;   · Use sun protection when outdoors. Uses spf           Current Outpatient Medications   Medication Sig Dispense Refill   • hydrochlorothiazide (MICROZIDE) 12.5 MG capsule Take 1 Cap by mouth every day. 90 Cap 3   • ramipril (ALTACE) 10 MG capsule Take 1 Cap by mouth 2 Times a Day. 180 Cap 3   • atorvastatin (LIPITOR) 20 MG Tab Take 1 Tab by mouth every day. 90 Tab 3   • ergocalciferol (DRISDOL) 77836 UNIT capsule Take 1 Cap by mouth every 7 days. 12 Cap 3   • aspirin (ASA) 81 MG CHEW Take 1 Tab by mouth every day. 100 Tab 11     No current facility-administered medications for this visit.      Patient Active Problem List   Diagnosis   • Hypertension   • Dyslipidemia   • Family history of breast cancer   • Preventative health care   • Tear meniscus knee   • " Tubular adenoma of colon     Depression Screening    Little interest or pleasure in doing things?  0 - not at all  Feeling down, depressed , or hopeless? 0 - not at all  Patient Health Questionnaire Score: 0      Screening for Cognitive Impairment    Three Minute Recall (apple, kitchen, baby)  3/3    Jeovany clock face with all 12 numbers and set the hands to show 10 past 10.   5    Cognitive concerns identified deferred for follow up unless specifically addressed in assessment and plan. No concerns    Fall Risk Assessment    Has the patient had two or more falls in the last year or any fall with injury in the last year?  No    Safety Assessment    Throw rugs on floor. N    Handrails on all stairs. No stairs    Good lighting in all hallways. Y    Difficulty hearing. N    Patient counseled about all safety risks that were identified. yes    Functional Assessment ADLs    Are there any barriers preventing you from cooking for yourself or meeting nutritional needs?   n    Are there any barriers preventing you from driving safely or obtaining transportation?   n    Are there any barriers preventing you from using a telephone or calling for help?   n    Are there any barriers preventing you from shopping?   n    Are there any barriers preventing you from taking care of your own finances?   n    Are there any barriers preventing you from managing your medications?   n    Are there any barriers preventing you from showering, bathing or dressing yourself? no   .    Are you currently engaging in any exercise or physical activity?   y    What is your perception of your health?    good       Health Maintenance Summary                HEPATITIS C SCREENING Overdue 1950     COLONOSCOPY Overdue 10/29/2018      Done 10/29/2013 AMB REFERRAL TO GI FOR COLONOSCOPY    IMM INFLUENZA Overdue 9/1/2019      Done 9/21/2018 Imm Admin: Influenza Vaccine Adult HD     Patient has more history with this topic...    Annual Wellness Visit Overdue  10/5/2019      Done 10/4/2018 Visit Dx: Medicare annual wellness visit, subsequent     Patient has more history with this topic...    MAMMOGRAM Next Due 10/19/2019      Done 10/19/2018 MA-SCREENING MAMMO BILAT W/TOMOSYNTHESIS W/CAD     Patient has more history with this topic...    IMM ZOSTER VACCINES Postponed 3/11/2020 Originally 9/3/1999. System: vaccine not available, other system reasons    IMM DTaP/Tdap/Td Vaccine Next Due 8/16/2022      Done 8/16/2012 Imm Admin: Tdap Vaccine    BONE DENSITY Next Due 10/19/2023      Done 10/19/2018 DS-BONE DENSITY STUDY (DEXA)          Patient Care Team:  Adeel Sepulveda M.D. as PCP - General  Blade Verduzco M.D. as Consulting Physician (Ophthalmology)  Helene Fox M.D. as Consulting Physician (OB/Gyn)      Review of Systems   Constitutional: Negative for weight loss.   HENT: Negative for hearing loss.    Eyes: Negative for blurred vision and double vision.   Respiratory: Negative for cough and shortness of breath.    Cardiovascular: Negative for chest pain and palpitations.   Gastrointestinal: Negative for abdominal pain, diarrhea and heartburn.   Genitourinary: Negative for frequency.   Musculoskeletal: Negative for back pain and joint pain.   Skin: Negative for rash.   Endo/Heme/Allergies: Negative for environmental allergies.   Psychiatric/Behavioral: Negative for depression. The patient is not nervous/anxious and does not have insomnia.           Objective:          Physical Exam   Constitutional: She appears well-developed and well-nourished. No distress.   HENT:   Head: Normocephalic and atraumatic.   Right Ear: External ear normal.   Left Ear: External ear normal.   Mouth/Throat: Oropharynx is clear and moist.   Eyes: Conjunctivae are normal. Left eye exhibits no discharge.   Neck: Neck supple. No tracheal deviation present. No thyromegaly present.   Cardiovascular: Normal rate, regular rhythm and normal heart sounds.   Pulmonary/Chest: Effort normal. No stridor.  No respiratory distress.   Abdominal: She exhibits no distension. There is no tenderness.   Musculoskeletal: She exhibits no edema.   Neurological: She is alert.   Skin: Skin is warm. She is not diaphoretic.   Psychiatric: She has a normal mood and affect. Her behavior is normal.   Nursing note and vitals reviewed.              Assessment/Plan:     Assessment  #1 annual wellness visit      #2 htn on altace 10 mg bid and hctz 12.5 mg stable and controlled today    #3 dyslipidemia 10/6/18 chol 30,trg 144,hdl 47,ldl 54 on lipitor 20 mg no side effects    #4 colon polyp 10/29/13 colon per [facility] tubular adenoma, repeat in 5 yrs due for repeat colonoscopy    #5 preventative health  8/12 tdap   10/29/13 colon per [facility] tubular adenoma, repeat in 5 yrs  9/16/14 pneumovax   10/15/15 prevnar at [facility]  8/31/17 pap per gyn  in [place], vaginal atrophy, urethral carbuncle, cystocele, rectocele, hemorrhoid, pap smear performed  10/6/8 vit d 50 on 40671 weekly  10/19/18 dexa LS-1.7,hip-0.5  10/29/18 mammogram    #6 low vitamin D      Plan  #!  Mammogram has been ordered for next month    #2 obtain influenza vaccine at pharmacy we are out    #3 declines shingles vaccine    #4 colon cancer screening referral    #5 continue medication, check blood pressure periodically record    #6 no further Pap smear necessary    #7 has had pneumococcal 13, pneumococcal 23, tetanus vaccine    #8 bone density next year    #9 health maintenance reviewed and updated, no need for hearing test or physical therapy    #9 continue sunscreen    #10 use seatbelts    #11 continue no tobacco, no alcohol    #12 nutrition, diet, exercise discussed    #14 follow-up 1 year    #5 labs

## 2019-10-18 ENCOUNTER — TELEPHONE (OUTPATIENT)
Dept: MEDICAL GROUP | Facility: MEDICAL CENTER | Age: 69
End: 2019-10-18

## 2019-10-18 LAB
25(OH)D3+25(OH)D2 SERPL-MCNC: 50.3 NG/ML (ref 30–100)
ALBUMIN SERPL-MCNC: 4.1 G/DL (ref 3.5–4.8)
ALBUMIN/CREAT UR: 2.6 MG/G CREAT (ref 0–30)
ALBUMIN/GLOB SERPL: 1.4 {RATIO} (ref 1.2–2.2)
ALP SERPL-CCNC: 89 IU/L (ref 39–117)
ALT SERPL-CCNC: 15 IU/L (ref 0–32)
APPEARANCE UR: CLEAR
AST SERPL-CCNC: 20 IU/L (ref 0–40)
BACTERIA #/AREA URNS HPF: NORMAL /[HPF]
BACTERIA UR CULT: NO GROWTH
BACTERIA UR CULT: NORMAL
BASOPHILS # BLD AUTO: 0 X10E3/UL (ref 0–0.2)
BASOPHILS NFR BLD AUTO: 0 %
BILIRUB SERPL-MCNC: 0.4 MG/DL (ref 0–1.2)
BILIRUB UR QL STRIP: NEGATIVE
BUN SERPL-MCNC: 10 MG/DL (ref 8–27)
BUN/CREAT SERPL: 11 (ref 12–28)
CALCIUM SERPL-MCNC: 9.4 MG/DL (ref 8.7–10.3)
CASTS URNS MICRO: NORMAL
CASTS URNS QL MICRO: NORMAL
CHLORIDE SERPL-SCNC: 105 MMOL/L (ref 96–106)
CHOLEST SERPL-MCNC: 140 MG/DL (ref 100–199)
CO2 SERPL-SCNC: 20 MMOL/L (ref 20–29)
COLOR UR: YELLOW
CREAT SERPL-MCNC: 0.93 MG/DL (ref 0.57–1)
CREAT UR-MCNC: 151.5 MG/DL
CRYSTALS URNS MICRO: NORMAL
EOSINOPHIL # BLD AUTO: 0.1 X10E3/UL (ref 0–0.4)
EOSINOPHIL NFR BLD AUTO: 3 %
EPI CELLS #/AREA URNS HPF: NORMAL /HPF (ref 0–10)
ERYTHROCYTE [DISTWIDTH] IN BLOOD BY AUTOMATED COUNT: 14 % (ref 12.3–15.4)
GLOBULIN SER CALC-MCNC: 2.9 G/DL (ref 1.5–4.5)
GLUCOSE SERPL-MCNC: 88 MG/DL (ref 65–99)
GLUCOSE UR QL: NEGATIVE
HCT VFR BLD AUTO: 38.7 % (ref 34–46.6)
HCV AB S/CO SERPL IA: 0.1 S/CO RATIO (ref 0–0.9)
HDLC SERPL-MCNC: 49 MG/DL
HGB BLD-MCNC: 13 G/DL (ref 11.1–15.9)
HGB UR QL STRIP: NEGATIVE
IMM GRANULOCYTES # BLD AUTO: 0 X10E3/UL (ref 0–0.1)
IMM GRANULOCYTES NFR BLD AUTO: 0 %
IMMATURE CELLS  115398: NORMAL
KETONES UR QL STRIP: NEGATIVE
LABORATORY COMMENT REPORT: ABNORMAL
LDLC SERPL CALC-MCNC: 60 MG/DL (ref 0–99)
LEUKOCYTE ESTERASE UR QL STRIP: ABNORMAL
LYMPHOCYTES # BLD AUTO: 2.1 X10E3/UL (ref 0.7–3.1)
LYMPHOCYTES NFR BLD AUTO: 39 %
MCH RBC QN AUTO: 28.9 PG (ref 26.6–33)
MCHC RBC AUTO-ENTMCNC: 33.6 G/DL (ref 31.5–35.7)
MCV RBC AUTO: 86 FL (ref 79–97)
MICRO URNS: ABNORMAL
MICRO URNS: ABNORMAL
MICROALBUMIN UR-MCNC: 3.9 UG/ML
MONOCYTES # BLD AUTO: 0.6 X10E3/UL (ref 0.1–0.9)
MONOCYTES NFR BLD AUTO: 10 %
MORPHOLOGY BLD-IMP: NORMAL
MUCOUS THREADS URNS QL MICRO: PRESENT
NEUTROPHILS # BLD AUTO: 2.6 X10E3/UL (ref 1.4–7)
NEUTROPHILS NFR BLD AUTO: 48 %
NITRITE UR QL STRIP: NEGATIVE
NRBC BLD AUTO-RTO: NORMAL %
PH UR STRIP: 5 [PH] (ref 5–7.5)
PLATELET # BLD AUTO: 279 X10E3/UL (ref 150–450)
POTASSIUM SERPL-SCNC: 4.4 MMOL/L (ref 3.5–5.2)
PROT SERPL-MCNC: 7 G/DL (ref 6–8.5)
PROT UR QL STRIP: NEGATIVE
RBC # BLD AUTO: 4.5 X10E6/UL (ref 3.77–5.28)
RBC #/AREA URNS HPF: NORMAL /HPF (ref 0–2)
RENAL EPI CELLS #/AREA URNS HPF: NORMAL /[HPF]
SODIUM SERPL-SCNC: 139 MMOL/L (ref 134–144)
SP GR UR: 1.02 (ref 1–1.03)
T VAGINALIS URNS QL MICRO: NORMAL
TRIGL SERPL-MCNC: 153 MG/DL (ref 0–149)
TSH SERPL DL<=0.005 MIU/L-ACNC: 2.89 UIU/ML (ref 0.45–4.5)
UNIDENT CRYS URNS QL MICRO: NORMAL
URINALYSIS REFLEX  377202: ABNORMAL
URNS CMNT MICRO: NORMAL
UROBILINOGEN UR STRIP-MCNC: 0.2 MG/DL (ref 0.2–1)
VLDLC SERPL CALC-MCNC: 31 MG/DL (ref 5–40)
WBC # BLD AUTO: 5.5 X10E3/UL (ref 3.4–10.8)
WBC #/AREA URNS HPF: NORMAL /HPF (ref 0–5)
YEAST #/AREA URNS HPF: NORMAL /[HPF]

## 2019-10-18 NOTE — TELEPHONE ENCOUNTER
Notified with results, no changes to medication regimen, continue good work with nutrition and exercise.

## 2019-10-21 ENCOUNTER — HOSPITAL ENCOUNTER (OUTPATIENT)
Dept: RADIOLOGY | Facility: MEDICAL CENTER | Age: 69
End: 2019-10-21
Attending: INTERNAL MEDICINE
Payer: MEDICARE

## 2019-10-21 DIAGNOSIS — Z12.31 VISIT FOR SCREENING MAMMOGRAM: ICD-10-CM

## 2019-10-21 PROCEDURE — 77063 BREAST TOMOSYNTHESIS BI: CPT

## 2020-10-19 ENCOUNTER — OFFICE VISIT (OUTPATIENT)
Dept: MEDICAL GROUP | Facility: MEDICAL CENTER | Age: 70
End: 2020-10-19
Payer: MEDICARE

## 2020-10-19 VITALS
OXYGEN SATURATION: 97 % | TEMPERATURE: 97.6 F | SYSTOLIC BLOOD PRESSURE: 130 MMHG | DIASTOLIC BLOOD PRESSURE: 72 MMHG | HEART RATE: 83 BPM | WEIGHT: 178.2 LBS | BODY MASS INDEX: 27.91 KG/M2

## 2020-10-19 DIAGNOSIS — M25.512 CHRONIC LEFT SHOULDER PAIN: ICD-10-CM

## 2020-10-19 DIAGNOSIS — Z00.00 MEDICARE ANNUAL WELLNESS VISIT, SUBSEQUENT: ICD-10-CM

## 2020-10-19 DIAGNOSIS — Z00.00 PREVENTATIVE HEALTH CARE: Chronic | ICD-10-CM

## 2020-10-19 DIAGNOSIS — Z80.3 FAMILY HISTORY OF MALIGNANT NEOPLASM OF BREAST: Chronic | ICD-10-CM

## 2020-10-19 DIAGNOSIS — M81.0 POSTMENOPAUSAL BONE LOSS: ICD-10-CM

## 2020-10-19 DIAGNOSIS — G89.29 CHRONIC LEFT SHOULDER PAIN: ICD-10-CM

## 2020-10-19 DIAGNOSIS — E55.9 VITAMIN D DEFICIENCY: ICD-10-CM

## 2020-10-19 DIAGNOSIS — E78.5 DYSLIPIDEMIA: Chronic | ICD-10-CM

## 2020-10-19 DIAGNOSIS — I10 ESSENTIAL HYPERTENSION: Chronic | ICD-10-CM

## 2020-10-19 PROCEDURE — G0439 PPPS, SUBSEQ VISIT: HCPCS | Performed by: INTERNAL MEDICINE

## 2020-10-19 ASSESSMENT — FIBROSIS 4 INDEX: FIB4 SCORE: 1.31

## 2020-10-19 ASSESSMENT — ENCOUNTER SYMPTOMS: GENERAL WELL-BEING: EXCELLENT

## 2020-10-19 ASSESSMENT — PATIENT HEALTH QUESTIONNAIRE - PHQ9: CLINICAL INTERPRETATION OF PHQ2 SCORE: 0

## 2020-10-19 ASSESSMENT — ACTIVITIES OF DAILY LIVING (ADL): BATHING_REQUIRES_ASSISTANCE: 0

## 2020-10-19 NOTE — PROGRESS NOTES
Chief Complaint   Patient presents with   • Annual Wellness Visit         HPI:  Sandee Grimm is a 70 y.o. here for Medicare Annual Wellness Visit   Meds and allergies reviewed and updated, med and surgical history reviewed and updated  Keeps active does sai emil workout video, tries to eat healthy   Has left shoulder pain since june no trauma, some radiation to the upper arm but not below the elbow, no radiation to hands, no trauma or overuse, can be worse at night, has tried tylenol, no nsaids     Patient Active Problem List    Diagnosis Date Noted   • Tubular adenoma of colon 11/01/2013   • Tear meniscus knee 08/16/2012   • Hypertension 08/22/2009   • Dyslipidemia 08/22/2009   • Family history of breast cancer 08/22/2009   • Preventative health care 08/22/2009         Tubular adenoma  10/29/13 colon per [facility] tubular adenoma, repeat in 5 yrs  1/30/20 colon per [facility] negative repeat 10 years       meniscus  7/12 MRI left knee  medial meniscal posterior horn/body tear      Preventive health  8/12 tdap   9/16/14 pneumovax   [facility]  8/31/17 pap per gyn  in [place], vaginal atrophy, urethral carbuncle, cystocele, rectocele, hemorrhoid, pap smear performed  10/19/18 dexa LS-1.7,hip-0.5  10/29/18 mammogram  10/18/19 vit d 50 on 59179 weekly  10/18/19 hep c ab negative  10/22/19 mammogram  1/30/20 colon per [facility] negative repeat 10 years     Hypertension  1/06 exercise thallium no evidence of ischemia ejection fraction 80%  1/06 echocardiogram normal left ventricular function  8/11 on altace 10 bid, add norvasc 5 mg  7/12 stopped norvasc due to leg swelling, on altace 10 mg bid  8/12 urine mac 4.8; on altace 10 mg bid and start hctz 12.5 mg   10/6/15 urine mac <5.8 on altace 10 mg bid and hctz 12.5 mg  9/24/16 urine mac 3.5 on altace 10 mg bid and hctz 12.5 mg  9/30/17 urine mac <2.7 on altace 10 mg bid and hctz 12.5 mg  10/18/19 urine mac 2.6 on altace 10 mg bid and hctz 12.5  mg  (intolerant norvasc)     Dyslipidemia  12/08 chol 162, trig 192,hdl 48,ldl 76, CRP 1  2/10 chol 143,trig 154,hdl 45,ldl 67 on lipitor 40 mg  8/11 chol 148,trig 141,hdl 52,ldl 68 on lipitor 40 mg  8/12 chol 122,trig 136,hdl 40,ldl 55, decrease lipitor to 20 mg (40 mg 1/2 per day)  9/26/13 chol 142,trig 124,hdl 50,ldl 67 on lipitor 20 mg (1/2 40 mg tab)  9/16/14 off lipitor no side effects, just wanted to stop the lipitor  9/18/14 chol 229,trig 140,hdl 46,ldl 155 off lipitor; resume lipitor 20 mg daily repeat lipid in 6 weeks  10/16/15 chol 146,trig 160,hdl 51,ldl 63 on lipitor 20 mg  9/24/16 chol 152,trig 167,hdl 51,ldl 68 on lipitor 20 mg  9/30/17 chol 144,trig 138,hdl 52,ldl 64 on lipitor 20 mg  10/6/18 chol 30,trg 144,hdl 47,ldl 54 on lipitor 20 mg  10/18/19 chol 140,trig 153,hdl 49,ldl 60 on lipitor 20 mg         Current Outpatient Medications   Medication Sig Dispense Refill   • hydrochlorothiazide (MICROZIDE) 12.5 MG capsule Take 1 Cap by mouth every day. 90 Cap 3   • ramipril (ALTACE) 10 MG capsule Take 1 Cap by mouth 2 Times a Day. 180 Cap 3   • atorvastatin (LIPITOR) 20 MG Tab Take 1 Tab by mouth every day. 90 Tab 3   • aspirin (ASA) 81 MG CHEW Take 1 Tab by mouth every day. 100 Tab 11   • ergocalciferol (DRISDOL) 12001 UNIT capsule Take 1 Cap by mouth every 7 days. (Patient not taking: Reported on 10/19/2020) 12 Cap 3     No current facility-administered medications for this visit.             Current supplements as per medication list.       Allergies: Erythromycin and Norvasc [amlodipine besylate]    Current social contact/activities: patient reports plays games on the computer and chats with friends, she calls her friends often on the phone   She  reports that she has never smoked. She has never used smokeless tobacco. She reports current alcohol use. She reports that she does not use drugs.  Counseling given: Not Answered      DPA/Advanced Directive:  Patient does not have an Advanced Directive.  A  packet and workshop information was given on Advanced Directives.    ROS:    Gait: Uses no assistive device  Ostomy: No  Other tubes: No  Amputations: No  Chronic oxygen use: No  Last eye exam: patient reports 2018   Wears hearing aids: No   : Denies any urinary leakage during the last 6 months      POLST/AD    Does the patient have a POLST or Advanced Directive?  No    Depression Screening    Little interest or pleasure in doing things?  0 - not at all  Feeling down, depressed , or hopeless? 0 - not at all  Patient Health Questionnaire Score: 0     If depressive symptoms identified deferred to follow up visit unless specifically addressed in assessment and plan.    Interpretation of PHQ-9 Total Score   Score Severity   1-4 No Depression   5-9 Mild Depression   10-14 Moderate Depression   15-19 Moderately Severe Depression   20-27 Severe Depression    Screening for Cognitive Impairment    Three Minute Recall (river, nation, finger) 3/3    Jeovany clock face with all 12 numbers and set the hands to show 10 past 11.  Yes 5/5  Cognitive concerns identified deferred for follow up unless specifically addressed in assessment and plan.    Fall Risk Assessment    Has the patient had two or more falls in the last year or any fall with injury in the last year?  No    Safety Assessment    Throw rugs on floor.  Yes  Handrails on all stairs.  Yes  Good lighting in all hallways.  Yes  Difficulty hearing.  No  Patient counseled about all safety risks that were identified.    Functional Assessment ADLs    Are there any barriers preventing you from cooking for yourself or meeting nutritional needs?  No.    Are there any barriers preventing you from driving safely or obtaining transportation?  No.    Are there any barriers preventing you from using a telephone or calling for help?  No.    Are there any barriers preventing you from shopping?  No.    Are there any barriers preventing you from taking care of your own finances?  No.     Are there any barriers preventing you from managing your medications?  No.    Are there any barriers preventing you from showering, bathing or dressing yourself?  No.    Are you currently engaging in any exercise or physical activity?  Yes.  Patient reports [employer]ib[employer] videos every week day for 30 inutes  What is your perception of your health?  Excellent.      Health Maintenance Summary                IMM ZOSTER VACCINES Overdue 9/3/1999     IMM INFLUENZA Overdue 9/1/2020      Done 10/14/2019 Imm Admin: Influenza Vaccine Adult HD     Patient has more history with this topic...    Annual Wellness Visit Overdue 10/14/2020      Done 10/14/2019 Visit Dx: Medicare annual wellness visit, subsequent     Patient has more history with this topic...    MAMMOGRAM Next Due 10/21/2020      Done 10/21/2019 MA-SCREENING MAMMO BILAT W/TOMOSYNTHESIS W/CAD     Patient has more history with this topic...    IMM DTaP/Tdap/Td Vaccine Next Due 8/16/2022      Done 8/16/2012 Imm Admin: Tdap Vaccine    BONE DENSITY Next Due 10/19/2023      Done 10/19/2018 DS-BONE DENSITY STUDY (DEXA)    COLONOSCOPY Next Due 1/30/2030      Done 1/30/2020 REFERRAL TO GI FOR COLONOSCOPY     Patient has more history with this topic...          Patient Care Team:  Adeel Sepulveda M.D. as PCP - General  Blade Verduzco M.D. as Consulting Physician (Ophthalmology)  Helene Fox M.D. as Consulting Physician (OB/Gyn)        Social History     Tobacco Use   • Smoking status: Never Smoker   • Smokeless tobacco: Never Used   Substance Use Topics   • Alcohol use: Yes     Alcohol/week: 0.0 oz     Comment: rare   • Drug use: No     Family History   Problem Relation Age of Onset   • Cancer Mother 68        breast cancer   • Diabetes Brother    • Cancer Sister 44        breast     She  has a past medical history of Dyslipidemia (8/22/2009), Family history of breast cancer (8/22/2009), and Hypertension (8/22/2009).   History reviewed. No pertinent surgical  history.    Exam:   /72 (BP Location: Right arm, Patient Position: Sitting, BP Cuff Size: Adult)   Pulse 83   Temp 36.4 °C (97.6 °F) (Temporal)   Wt 80.8 kg (178 lb 3.2 oz)   SpO2 97%  Body mass index is 27.91 kg/m².    Hearing and dentition fair   Alert, oriented in no acute distress.  Eye contact is good, speech goal directed, affect calm  Lungs clear  Cv s1s2 regular  Left shoulder decreased abduction, negative Warren, no tenderness to palpation glenohumeral or AC joint, normal left elbow flexion extension, normal  strength, normal sensation left upper extremity    Assessment and Plan. The following treatment and monitoring plan is recommended:    Assessment  #! Wellness assessment    #2 hypertension stable on altace and hctz    #3 dyslipidemia on lipitor 10/18/19 chol 140,trig 153,hdl 49,ldl 60 on lipitor 20 mg    #4 postmenopausal bone loss 10/19/18 dexa LS-1.7,hip-0.5    #5 left shoulder pain question impingement syndrome, less likely rotator cuff disorder, no cervical radiculopathy    #6 preventative health  8/12 tdap   9/16/14 pneumovax   [facility]  8/31/17 pap per gyn  in [place], vaginal atrophy, urethral carbuncle, cystocele, rectocele, hemorrhoid, pap smear performed  10/19/18 dexa LS-1.7,hip-0.5  10/18/19 vit d 50 on 10106 weekly  10/18/19 hep c ab negative  10/22/19 mammogram  1/30/20 colon per [facility] negative repeat 10 years   10/19/20 off vit d     #7 colon polyp adenoma in January    #8 vitamin D deficiency    Plan  #! dexa    #2 mammogram already scheduled     #3 has had flu vaccine    #4 labs     #5 left shoulder xray     #6 recommend physical therapy for shoulder she declines    #7 no need for hearing test    #8 Next colonoscopy 5 years    #9 nutrition, diet, exercise discussed    #10 health maintenance reviewed and updated    #11 nutrition, diet, exercise discussed    #12 continue social distancing during COVID-19 pandemic    Services suggested:    Health Care  Screening: Age-appropriate preventive services recommended by USPTF and ACIP covered by Medicare were discussed today. Services ordered if indicated and agreed upon by the patient.  Referrals offered: Community-based lifestyle interventions to reduce health risks and promote self-management and wellness, fall prevention, nutrition, physical activity, tobacco-use cessation, weight loss, and mental health services as per orders if indicated.    Discussion today about general wellness and lifestyle habits:    · Prevent falls and reduce trip hazards; Cautioned about securing or removing rugs.  · Have a working fire alarm and carbon monoxide detector;   · Engage in regular physical activity and social activities

## 2020-10-22 ENCOUNTER — HOSPITAL ENCOUNTER (OUTPATIENT)
Dept: RADIOLOGY | Facility: MEDICAL CENTER | Age: 70
End: 2020-10-22
Attending: INTERNAL MEDICINE
Payer: MEDICARE

## 2020-10-22 ENCOUNTER — TELEPHONE (OUTPATIENT)
Dept: MEDICAL GROUP | Facility: MEDICAL CENTER | Age: 70
End: 2020-10-22

## 2020-10-22 DIAGNOSIS — G89.29 CHRONIC LEFT SHOULDER PAIN: ICD-10-CM

## 2020-10-22 DIAGNOSIS — Z12.31 VISIT FOR SCREENING MAMMOGRAM: ICD-10-CM

## 2020-10-22 DIAGNOSIS — M25.512 CHRONIC LEFT SHOULDER PAIN: ICD-10-CM

## 2020-10-22 PROCEDURE — 77067 SCR MAMMO BI INCL CAD: CPT

## 2020-10-22 PROCEDURE — 73030 X-RAY EXAM OF SHOULDER: CPT | Mod: LT

## 2020-10-23 ENCOUNTER — TELEPHONE (OUTPATIENT)
Dept: MEDICAL GROUP | Facility: MEDICAL CENTER | Age: 70
End: 2020-10-23

## 2020-10-23 DIAGNOSIS — E78.5 DYSLIPIDEMIA: ICD-10-CM

## 2020-10-23 DIAGNOSIS — I10 ESSENTIAL HYPERTENSION: ICD-10-CM

## 2020-10-23 LAB
25(OH)D3+25(OH)D2 SERPL-MCNC: 22.1 NG/ML (ref 30–100)
ALBUMIN SERPL-MCNC: 4.2 G/DL (ref 3.7–4.7)
ALBUMIN/CREAT UR: <4 MG/G CREAT (ref 0–29)
ALBUMIN/GLOB SERPL: 1.4 {RATIO} (ref 1.2–2.2)
ALP SERPL-CCNC: 98 IU/L (ref 39–117)
ALT SERPL-CCNC: 18 IU/L (ref 0–32)
APPEARANCE UR: CLEAR
AST SERPL-CCNC: 24 IU/L (ref 0–40)
BACTERIA #/AREA URNS HPF: NORMAL /[HPF]
BACTERIA UR CULT: NO GROWTH
BACTERIA UR CULT: NORMAL
BASOPHILS # BLD AUTO: 0.1 X10E3/UL (ref 0–0.2)
BASOPHILS NFR BLD AUTO: 1 %
BILIRUB SERPL-MCNC: 0.6 MG/DL (ref 0–1.2)
BILIRUB UR QL STRIP: NEGATIVE
BUN SERPL-MCNC: 13 MG/DL (ref 8–27)
BUN/CREAT SERPL: 13 (ref 12–28)
CALCIUM SERPL-MCNC: 9.6 MG/DL (ref 8.7–10.3)
CASTS URNS MICRO: NORMAL
CASTS URNS QL MICRO: NORMAL
CHLORIDE SERPL-SCNC: 99 MMOL/L (ref 96–106)
CHOLEST SERPL-MCNC: 144 MG/DL (ref 100–199)
CO2 SERPL-SCNC: 22 MMOL/L (ref 20–29)
COLOR UR: YELLOW
CREAT SERPL-MCNC: 0.99 MG/DL (ref 0.57–1)
CREAT UR-MCNC: 68 MG/DL
CRYSTALS URNS MICRO: NORMAL
EOSINOPHIL # BLD AUTO: 0.1 X10E3/UL (ref 0–0.4)
EOSINOPHIL NFR BLD AUTO: 3 %
EPI CELLS #/AREA URNS HPF: NORMAL /HPF (ref 0–10)
ERYTHROCYTE [DISTWIDTH] IN BLOOD BY AUTOMATED COUNT: 12.7 % (ref 11.7–15.4)
GLOBULIN SER CALC-MCNC: 3 G/DL (ref 1.5–4.5)
GLUCOSE SERPL-MCNC: 84 MG/DL (ref 65–99)
GLUCOSE UR QL: NEGATIVE
HCT VFR BLD AUTO: 39.3 % (ref 34–46.6)
HDLC SERPL-MCNC: 47 MG/DL
HGB BLD-MCNC: 13.1 G/DL (ref 11.1–15.9)
HGB UR QL STRIP: NEGATIVE
IMM GRANULOCYTES # BLD AUTO: 0 X10E3/UL (ref 0–0.1)
IMM GRANULOCYTES NFR BLD AUTO: 0 %
IMMATURE CELLS  115398: NORMAL
KETONES UR QL STRIP: NEGATIVE
LABORATORY COMMENT REPORT: NORMAL
LDLC SERPL CALC-MCNC: 72 MG/DL (ref 0–99)
LEUKOCYTE ESTERASE UR QL STRIP: ABNORMAL
LYMPHOCYTES # BLD AUTO: 2 X10E3/UL (ref 0.7–3.1)
LYMPHOCYTES NFR BLD AUTO: 36 %
MCH RBC QN AUTO: 28.9 PG (ref 26.6–33)
MCHC RBC AUTO-ENTMCNC: 33.3 G/DL (ref 31.5–35.7)
MCV RBC AUTO: 87 FL (ref 79–97)
MICRO URNS: ABNORMAL
MICRO URNS: ABNORMAL
MICROALBUMIN UR-MCNC: <3 UG/ML
MONOCYTES # BLD AUTO: 0.5 X10E3/UL (ref 0.1–0.9)
MONOCYTES NFR BLD AUTO: 10 %
MORPHOLOGY BLD-IMP: NORMAL
MUCOUS THREADS URNS QL MICRO: PRESENT
NEUTROPHILS # BLD AUTO: 2.9 X10E3/UL (ref 1.4–7)
NEUTROPHILS NFR BLD AUTO: 50 %
NITRITE UR QL STRIP: NEGATIVE
NRBC BLD AUTO-RTO: NORMAL %
PH UR STRIP: 6 [PH] (ref 5–7.5)
PLATELET # BLD AUTO: 301 X10E3/UL (ref 150–450)
POTASSIUM SERPL-SCNC: 4.2 MMOL/L (ref 3.5–5.2)
PROT SERPL-MCNC: 7.2 G/DL (ref 6–8.5)
PROT UR QL STRIP: NEGATIVE
RBC # BLD AUTO: 4.54 X10E6/UL (ref 3.77–5.28)
RBC #/AREA URNS HPF: NORMAL /HPF (ref 0–2)
RENAL EPI CELLS #/AREA URNS HPF: NORMAL /[HPF]
SODIUM SERPL-SCNC: 137 MMOL/L (ref 134–144)
SP GR UR: 1.01 (ref 1–1.03)
T VAGINALIS URNS QL MICRO: NORMAL
TRIGL SERPL-MCNC: 145 MG/DL (ref 0–149)
TSH SERPL DL<=0.005 MIU/L-ACNC: 1.48 UIU/ML (ref 0.45–4.5)
UNIDENT CRYS URNS QL MICRO: NORMAL
URINALYSIS REFLEX  377202: ABNORMAL
URNS CMNT MICRO: NORMAL
UROBILINOGEN UR STRIP-MCNC: 0.2 MG/DL (ref 0.2–1)
VLDLC SERPL CALC-MCNC: 25 MG/DL (ref 5–40)
WBC # BLD AUTO: 5.6 X10E3/UL (ref 3.4–10.8)
WBC #/AREA URNS HPF: NORMAL /HPF (ref 0–5)
YEAST #/AREA URNS HPF: NORMAL /[HPF]

## 2020-10-23 RX ORDER — HYDROCHLOROTHIAZIDE 12.5 MG/1
12.5 CAPSULE, GELATIN COATED ORAL DAILY
Qty: 90 CAP | Refills: 3 | Status: SHIPPED | OUTPATIENT
Start: 2020-10-23 | End: 2021-10-19 | Stop reason: SDUPTHER

## 2020-10-23 RX ORDER — ATORVASTATIN CALCIUM 20 MG/1
20 TABLET, FILM COATED ORAL DAILY
Qty: 90 TAB | Refills: 3 | Status: SHIPPED | OUTPATIENT
Start: 2020-10-23 | End: 2021-10-19 | Stop reason: SDUPTHER

## 2020-10-23 RX ORDER — RAMIPRIL 10 MG/1
10 CAPSULE ORAL 2 TIMES DAILY
Qty: 180 CAP | Refills: 3 | Status: SHIPPED | OUTPATIENT
Start: 2020-10-23 | End: 2021-10-19 | Stop reason: SDUPTHER

## 2021-01-15 DIAGNOSIS — Z23 NEED FOR VACCINATION: ICD-10-CM

## 2021-03-24 ENCOUNTER — IMMUNIZATION (OUTPATIENT)
Dept: FAMILY PLANNING/WOMEN'S HEALTH CLINIC | Facility: IMMUNIZATION CENTER | Age: 71
End: 2021-03-24
Attending: INTERNAL MEDICINE
Payer: MEDICARE

## 2021-03-24 DIAGNOSIS — Z23 ENCOUNTER FOR VACCINATION: Primary | ICD-10-CM

## 2021-03-24 DIAGNOSIS — Z23 NEED FOR VACCINATION: ICD-10-CM

## 2021-03-24 PROCEDURE — 91300 PFIZER SARS-COV-2 VACCINE: CPT

## 2021-03-24 PROCEDURE — 0001A PFIZER SARS-COV-2 VACCINE: CPT

## 2021-04-15 ENCOUNTER — IMMUNIZATION (OUTPATIENT)
Dept: FAMILY PLANNING/WOMEN'S HEALTH CLINIC | Facility: IMMUNIZATION CENTER | Age: 71
End: 2021-04-15
Attending: INTERNAL MEDICINE
Payer: MEDICARE

## 2021-04-15 DIAGNOSIS — Z23 ENCOUNTER FOR VACCINATION: Primary | ICD-10-CM

## 2021-04-15 PROCEDURE — 0002A PFIZER SARS-COV-2 VACCINE: CPT

## 2021-04-15 PROCEDURE — 91300 PFIZER SARS-COV-2 VACCINE: CPT

## 2021-10-15 PROBLEM — M25.519 SHOULDER PAIN: Status: ACTIVE | Noted: 2021-10-15

## 2021-10-19 ENCOUNTER — OFFICE VISIT (OUTPATIENT)
Dept: MEDICAL GROUP | Facility: MEDICAL CENTER | Age: 71
End: 2021-10-19
Payer: MEDICARE

## 2021-10-19 VITALS
BODY MASS INDEX: 27.62 KG/M2 | WEIGHT: 176 LBS | DIASTOLIC BLOOD PRESSURE: 66 MMHG | HEART RATE: 88 BPM | OXYGEN SATURATION: 96 % | TEMPERATURE: 98.2 F | SYSTOLIC BLOOD PRESSURE: 128 MMHG | HEIGHT: 67 IN

## 2021-10-19 DIAGNOSIS — I10 ESSENTIAL HYPERTENSION: ICD-10-CM

## 2021-10-19 DIAGNOSIS — Z00.00 PREVENTATIVE HEALTH CARE: Chronic | ICD-10-CM

## 2021-10-19 DIAGNOSIS — Z00.00 MEDICARE ANNUAL WELLNESS VISIT, SUBSEQUENT: ICD-10-CM

## 2021-10-19 DIAGNOSIS — E78.5 DYSLIPIDEMIA: ICD-10-CM

## 2021-10-19 DIAGNOSIS — M81.0 POSTMENOPAUSAL BONE LOSS: ICD-10-CM

## 2021-10-19 DIAGNOSIS — E55.9 VITAMIN D DEFICIENCY: ICD-10-CM

## 2021-10-19 PROCEDURE — G0439 PPPS, SUBSEQ VISIT: HCPCS | Performed by: INTERNAL MEDICINE

## 2021-10-19 RX ORDER — RAMIPRIL 10 MG/1
10 CAPSULE ORAL 2 TIMES DAILY
Qty: 180 CAPSULE | Refills: 3 | Status: SHIPPED | OUTPATIENT
Start: 2021-10-19 | End: 2022-10-05 | Stop reason: SDUPTHER

## 2021-10-19 RX ORDER — ATORVASTATIN CALCIUM 20 MG/1
20 TABLET, FILM COATED ORAL DAILY
Qty: 90 TABLET | Refills: 3 | Status: SHIPPED | OUTPATIENT
Start: 2021-10-19 | End: 2022-10-05 | Stop reason: SDUPTHER

## 2021-10-19 RX ORDER — HYDROCHLOROTHIAZIDE 12.5 MG/1
12.5 CAPSULE, GELATIN COATED ORAL DAILY
Qty: 90 CAPSULE | Refills: 3 | Status: SHIPPED | OUTPATIENT
Start: 2021-10-19 | End: 2022-10-05 | Stop reason: SDUPTHER

## 2021-10-19 ASSESSMENT — FIBROSIS 4 INDEX: FIB4 SCORE: 1.35

## 2021-10-19 ASSESSMENT — PATIENT HEALTH QUESTIONNAIRE - PHQ9: CLINICAL INTERPRETATION OF PHQ2 SCORE: 0

## 2021-10-19 NOTE — PROGRESS NOTES
Subjective     Sandee Grimm is a 71 y.o. female who presents with medicare wellness          HPI      CC:  Health Risk Assessment Exam.    HPI: medical wellness assessment  Sandee is a 71 y.o. here for Health Risk Assessment.   Medications, allergies, medical history, surgical history, social history, family history  reviewed and updated  PCP: Adeel Sepulveda M.D.  SH , does home cardio tapes 30 minutes per day, tries to eat healthy limiting sweets and processed foods, has had her influenza vaccine and the Covid vaccine first and second shot of Pfizer, second shot April 15  Teeth cleaning twice per year  No hearing changes  Checks blood pressure occasionally and has been stable at home on Altace 10 mg, chlorothiazide 12.5 mg, was on Lipitor 20 mg          Patient Active Problem List    Diagnosis Date Noted   • Shoulder pain 10/15/2021   • Tubular adenoma of colon 11/01/2013   • Tear meniscus knee 08/16/2012   • Hypertension 08/22/2009   • Dyslipidemia 08/22/2009   • Family history of breast cancer 08/22/2009   • Preventative health care 08/22/2009     Current Outpatient Medications   Medication Sig Dispense Refill   • hydrochlorothiazide (MICROZIDE) 12.5 MG capsule Take 1 Cap by mouth every day. 90 Cap 3   • ramipril (ALTACE) 10 MG capsule Take 1 Cap by mouth 2 Times a Day. 180 Cap 3   • atorvastatin (LIPITOR) 20 MG Tab Take 1 Tab by mouth every day. 90 Tab 3   • aspirin (ASA) 81 MG CHEW Take 1 Tab by mouth every day. 100 Tab 11     No current facility-administered medications for this visit.      Current supplements: reviewed  Chronic narcotic pain medicines: no  Allergies: Erythromycin and Norvasc [amlodipine besylate]    Screening:reviewed   Depressive Symptoms: Denies feeling down, depressed or hopeless. Denies loss of interest or pleasure in doing things  ADLs: Denies needing help with using telephone, transportation, shopping, preparing meals, housework, laundry, or managing medication or  money.   Independent with bathing, hygiene, feeding, toileting, dressing   Memory concerns: Denies difficulty remembering details of conversations, events and upcoming appointments.  Hearing problems: Denies.   Recent falls no    Current social contact/activities: reviewed      Social History     Tobacco Use   • Smoking status: Never Smoker   • Smokeless tobacco: Never Used   Vaping Use   • Vaping Use: Never used   Substance Use Topics   • Alcohol use: Yes     Alcohol/week: 0.0 oz     Comment: rare   • Drug use: No       Tubular adenoma  10/29/13 colon per [facility] tubular adenoma, repeat in 5 yrs  1/30/20 colon per [facility] negative repeat 10 years       tear meniscus  7/12 MRI left knee  medial meniscal posterior horn/body tear     Shoulder pain  10/19/20 x-ray left shoulder, if negative consider physical therapy  10/22/20 x-ray left shoulder mild osteoarthritis, offered PT she declines     Preventive health  8/12 tdap   9/16/14 pneumovax   [facility]  8/31/17 pap per gyn  in [place], vaginal atrophy, urethral carbuncle, cystocele, rectocele, hemorrhoid, pap smear performed  10/19/18 dexa LS-1.7,hip-0.5  10/29/18 mammogram  10/18/19 vit d 50 on 27526 weekly  10/18/19 hep c ab negative  10/22/19 mammogram  1/30/20 colon per [facility] negative repeat 10 years  41/5/21 covid pfizer second  9/24/21 flu      Hypertension  1/06 exercise thallium no evidence of ischemia ejection fraction 80%  1/06 echocardiogram normal left ventricular function  8/11 on altace 10 bid, add norvasc 5 mg  7/12 stopped norvasc due to leg swelling, on altace 10 mg bid  8/12 urine mac 4.8; on altace 10 mg bid and start hctz 12.5 mg   10/6/15 urine mac <5.8 on altace 10 mg bid and hctz 12.5 mg  9/24/16 urine mac 3.5 on altace 10 mg bid and hctz 12.5 mg  9/30/17 urine mac <2.7 on altace 10 mg bid and hctz 12.5 mg  10/18/19 urine mac 2.6 on altace 10 mg bid and hctz 12.5 mg  10/23/20 urine mac<4 on altace 10 mg bid and hctz 12.5  mg  (intolerant norvasc)     Dyslipidemia  12/08 chol 162, trig 192,hdl 48,ldl 76, CRP 1  2/10 chol 143,trig 154,hdl 45,ldl 67 on lipitor 40 mg  8/11 chol 148,trig 141,hdl 52,ldl 68 on lipitor 40 mg  8/12 chol 122,trig 136,hdl 40,ldl 55, decrease lipitor to 20 mg (40 mg 1/2 per day)  9/26/13 chol 142,trig 124,hdl 50,ldl 67 on lipitor 20 mg (1/2 40 mg tab)  9/16/14 off lipitor no side effects, just wanted to stop the lipitor  9/18/14 chol 229,trig 140,hdl 46,ldl 155 off lipitor; resume lipitor 20 mg daily repeat lipid in 6 weeks  10/16/15 chol 146,trig 160,hdl 51,ldl 63 on lipitor 20 mg  9/24/16 chol 152,trig 167,hdl 51,ldl 68 on lipitor 20 mg  9/30/17 chol 144,trig 138,hdl 52,ldl 64 on lipitor 20 mg  10/6/18 chol 30,trg 144,hdl 47,ldl 54 on lipitor 20 mg  10/18/19 chol 140,trig 153,hdl 49,ldl 60 on lipitor 20 mg  10/23/20 chol 144,trig 145,hdl 47,ldl 72 on lipitor 20 mg                 Family History   Problem Relation Age of Onset   • Cancer Mother 68        breast cancer   • Diabetes Brother    • Cancer Sister 44        breast     History reviewed. No pertinent surgical history.    ROS:    Ostomy or other tubes or amputations: no  Chronic oxygen use no      Gait: normal. Uses assistive device : no  Goes to [place] once per week  No driving problems     Depression Screening    Little interest or pleasure in doing things?  0 - not at all  Feeling down, depressed , or hopeless? 0 - not at all  Patient Health Questionnaire Score: 0     If depressive symptoms identified deferred to follow up visit unless specifically addressed in assessment and plan.    Interpretation of PHQ-9 Total Score   Score Severity   1-4 No Depression   5-9 Mild Depression   10-14 Moderate Depression   15-19 Moderately Severe Depression   20-27 Severe Depression    Screening for Cognitive Impairment    Three Minute Recall (captain, garden, picture)  3/3    Cognitive concerns identified deferred for follow up unless specifically addressed in  assessment and plan.    Fall Risk Assessment    Has the patient had two or more falls in the last year or any fall with injury in the last year?  No    Safety Assessment    Throw rugs on floor. y    Handrails on all stairs. No stairs    Good lighting in all hallways. y    Difficulty hearing. n    Patient counseled about all safety risks that were identified.    Functional Assessment ADLs    Are there any barriers preventing you from cooking for yourself or meeting nutritional needs?   n    Are there any barriers preventing you from driving safely or obtaining transportation?   n    Are there any barriers preventing you from using a telephone or calling for help?   n    Are there any barriers preventing you from shopping?   n    Are there any barriers preventing you from taking care of your own finances?   n    Are there any barriers preventing you from managing your medications?   n    Are there any barriers preventing you from showering, bathing or dressing yourself?n   .    Are you currently engaging in any exercise or physical activity?   y    What is your perception of your health?   good      Health Maintenance Summary                MAMMOGRAM Next Due 10/22/2021      Done 10/22/2020 MA-SCREENING MAMMO BILAT W/TOMOSYNTHESIS W/CAD     Patient has more history with this topic...    IMM ZOSTER VACCINES Postponed 3/19/2022 Originally 9/3/1999. Patient Refused    Annual Wellness Visit Next Due 10/20/2021      Done 10/19/2020 Visit Dx: Medicare annual wellness visit, subsequent     Patient has more history with this topic...    IMM DTaP/Tdap/Td Vaccine Next Due 8/16/2022      Done 8/16/2012 Imm Admin: Tdap Vaccine    BONE DENSITY Next Due 10/19/2023      Done 10/19/2018 DS-BONE DENSITY STUDY (DEXA)    COLORECTAL CANCER SCREENING Next Due 1/30/2030           Patient Care Team:  Adeel Sepulveda M.D. as PCP - General  Blade Verduzco M.D. as Consulting Physician (Ophthalmology)  Helene Fox M.D. as Consulting  "Physician (Obstetrics & Gynecology)      Health Care Screening recommendations reviewed with patient today and updated or ordered.  DPA/Advanced directive: Completed/Information provided. reviewed  Referrals for PT/OT/Nutrition counseling/Behavioral Health/Smoking cessation as above if indicated  Discussion today about general wellness and lifestyle habits:    · Prevent falls and reduce trip hazards  · Have a working fire alarm    · Engage in regular physical activity and social activities;   · Use sun protection when outdoors. Uses hat outdoors       ROS           Objective     /66 (BP Location: Right arm, Patient Position: Sitting, BP Cuff Size: Adult)   Pulse (!) 108   Temp 36.8 °C (98.2 °F)   Ht 1.702 m (5' 7\")   Wt 79.8 kg (176 lb)   SpO2 96%   BMI 27.57 kg/m²      Physical Exam  Vitals and nursing note reviewed.   Constitutional:       Appearance: Normal appearance.   HENT:      Head: Normocephalic and atraumatic.      Right Ear: External ear normal.      Left Ear: External ear normal.   Eyes:      Conjunctiva/sclera: Conjunctivae normal.   Cardiovascular:      Rate and Rhythm: Normal rate and regular rhythm.      Heart sounds: Normal heart sounds.   Pulmonary:      Effort: Pulmonary effort is normal.      Breath sounds: Normal breath sounds.   Abdominal:      General: There is no distension.   Musculoskeletal:         General: No swelling.   Skin:     General: Skin is warm.   Neurological:      General: No focal deficit present.      Mental Status: She is alert.   Psychiatric:         Mood and Affect: Mood normal.         Behavior: Behavior normal.                 Assessment & Plan        Assessment  #1 medicare assessment    #2 hypertension most recent labs 10/23/20 urine mac<4 stable on altace 10 mg bid and hctz 12.5 mg    #3 dyslipidemia 10/23/20 chol 144,trig 145,hdl 47,ldl 72 on lipitor 20 mg    #4 preventive health  8/12 tdap   9/16/14 pneumovax   [facility]  8/31/17 pap per gyn "  in [place], vaginal atrophy, urethral carbuncle, cystocele, rectocele, hemorrhoid, pap smear performed  10/19/18 dexa LS-1.7,hip-0.5  10/18/19 hep c ab negative  1/30/20 colon per [facility] negative repeat 10 years  10/23/20 mammogram   10/23/20 vit d 22 start 5000 units daily   41/5/21 covid pfizer second  9/24/21 flu     #5 postmenopausal bone loss    #6 vitamin D deficiency      Plan  #! Health maintenance reviewed and updated    #2 refill medications hydrochlorothiazide, Altace, Lipitor sent to [facility]    #3 mammogram scheduled for this Friday    #4 add bone density test for postmenopausal bone loss    #5 patient can get the Covid booster at the pharmacy, 6 months out from her second shot    #6 recommend Shingrix vaccine she declines     #7 declines hearing test    #8 no need for physical therapy    #9 labs    #10 follow-up 1 year

## 2021-10-23 ENCOUNTER — TELEPHONE (OUTPATIENT)
Dept: MEDICAL GROUP | Facility: MEDICAL CENTER | Age: 71
End: 2021-10-23

## 2021-10-23 DIAGNOSIS — R31.29 MICROSCOPIC HEMATURIA: ICD-10-CM

## 2021-10-23 LAB
25(OH)D3+25(OH)D2 SERPL-MCNC: 62.7 NG/ML (ref 30–100)
ALBUMIN SERPL-MCNC: 4.3 G/DL (ref 3.7–4.7)
ALBUMIN/CREAT UR: 4 MG/G CREAT (ref 0–29)
ALBUMIN/GLOB SERPL: 1.3 {RATIO} (ref 1.2–2.2)
ALP SERPL-CCNC: 91 IU/L (ref 44–121)
ALT SERPL-CCNC: 16 IU/L (ref 0–32)
APPEARANCE UR: CLEAR
AST SERPL-CCNC: 22 IU/L (ref 0–40)
BACTERIA #/AREA URNS HPF: ABNORMAL /[HPF]
BASOPHILS # BLD AUTO: 0.1 X10E3/UL (ref 0–0.2)
BASOPHILS NFR BLD AUTO: 1 %
BILIRUB SERPL-MCNC: 0.4 MG/DL (ref 0–1.2)
BILIRUB UR QL STRIP: NEGATIVE
BUN SERPL-MCNC: 13 MG/DL (ref 8–27)
BUN/CREAT SERPL: 12 (ref 12–28)
CALCIUM SERPL-MCNC: 9.9 MG/DL (ref 8.7–10.3)
CASTS URNS MICRO: ABNORMAL
CASTS URNS QL MICRO: ABNORMAL /LPF
CHLORIDE SERPL-SCNC: 103 MMOL/L (ref 96–106)
CHOLEST SERPL-MCNC: 152 MG/DL (ref 100–199)
CO2 SERPL-SCNC: 22 MMOL/L (ref 20–29)
COLOR UR: YELLOW
CREAT SERPL-MCNC: 1.06 MG/DL (ref 0.57–1)
CREAT UR-MCNC: 227.3 MG/DL
CRYSTALS URNS MICRO: ABNORMAL
EOSINOPHIL # BLD AUTO: 0.2 X10E3/UL (ref 0–0.4)
EOSINOPHIL NFR BLD AUTO: 3 %
EPI CELLS #/AREA URNS HPF: ABNORMAL /HPF (ref 0–10)
ERYTHROCYTE [DISTWIDTH] IN BLOOD BY AUTOMATED COUNT: 13 % (ref 11.7–15.4)
GLOBULIN SER CALC-MCNC: 3.2 G/DL (ref 1.5–4.5)
GLUCOSE SERPL-MCNC: 89 MG/DL (ref 65–99)
GLUCOSE UR QL: NEGATIVE
HCT VFR BLD AUTO: 39.1 % (ref 34–46.6)
HDLC SERPL-MCNC: 51 MG/DL
HGB BLD-MCNC: 13.3 G/DL (ref 11.1–15.9)
HGB UR QL STRIP: NEGATIVE
IMM GRANULOCYTES # BLD AUTO: 0 X10E3/UL (ref 0–0.1)
IMM GRANULOCYTES NFR BLD AUTO: 0 %
IMMATURE CELLS  115398: NORMAL
KETONES UR QL STRIP: NEGATIVE
LABORATORY COMMENT REPORT: ABNORMAL
LDLC SERPL CALC-MCNC: 75 MG/DL (ref 0–99)
LEUKOCYTE ESTERASE UR QL STRIP: ABNORMAL
LYMPHOCYTES # BLD AUTO: 2.4 X10E3/UL (ref 0.7–3.1)
LYMPHOCYTES NFR BLD AUTO: 40 %
MCH RBC QN AUTO: 29.4 PG (ref 26.6–33)
MCHC RBC AUTO-ENTMCNC: 34 G/DL (ref 31.5–35.7)
MCV RBC AUTO: 86 FL (ref 79–97)
MICRO URNS: ABNORMAL
MICROALBUMIN UR-MCNC: 8 UG/ML
MONOCYTES # BLD AUTO: 0.7 X10E3/UL (ref 0.1–0.9)
MONOCYTES NFR BLD AUTO: 11 %
MORPHOLOGY BLD-IMP: NORMAL
MUCOUS THREADS URNS QL MICRO: ABNORMAL
NEUTROPHILS # BLD AUTO: 2.8 X10E3/UL (ref 1.4–7)
NEUTROPHILS NFR BLD AUTO: 45 %
NITRITE UR QL STRIP: NEGATIVE
NRBC BLD AUTO-RTO: NORMAL %
PH UR STRIP: 5.5 [PH] (ref 5–7.5)
PLATELET # BLD AUTO: 268 X10E3/UL (ref 150–450)
POTASSIUM SERPL-SCNC: 3.9 MMOL/L (ref 3.5–5.2)
PROT SERPL-MCNC: 7.5 G/DL (ref 6–8.5)
PROT UR QL STRIP: NEGATIVE
RBC # BLD AUTO: 4.53 X10E6/UL (ref 3.77–5.28)
RBC #/AREA URNS HPF: ABNORMAL /HPF (ref 0–2)
RENAL EPI CELLS #/AREA URNS HPF: ABNORMAL /[HPF]
SODIUM SERPL-SCNC: 138 MMOL/L (ref 134–144)
SP GR UR: 1.02 (ref 1–1.03)
T VAGINALIS URNS QL MICRO: ABNORMAL
TRIGL SERPL-MCNC: 151 MG/DL (ref 0–149)
TSH SERPL DL<=0.005 MIU/L-ACNC: 2.76 UIU/ML (ref 0.45–4.5)
UNIDENT CRYS URNS QL MICRO: PRESENT
URNS CMNT MICRO: ABNORMAL
UROBILINOGEN UR STRIP-MCNC: 0.2 MG/DL (ref 0.2–1)
VLDLC SERPL CALC-MCNC: 26 MG/DL (ref 5–40)
WBC # BLD AUTO: 6.2 X10E3/UL (ref 3.4–10.8)
WBC #/AREA URNS HPF: ABNORMAL /HPF (ref 0–5)
YEAST #/AREA URNS HPF: ABNORMAL /[HPF]

## 2021-10-23 NOTE — TELEPHONE ENCOUNTER
Notified with labs, cholesterol stable, no change in medications, microscopic hematuria will order repeat urinalysis nonfasting, ensure adequate hydration prior to urine test with water.  Continue vitamin D supplementation.

## 2021-10-26 ENCOUNTER — HOSPITAL ENCOUNTER (OUTPATIENT)
Dept: RADIOLOGY | Facility: MEDICAL CENTER | Age: 71
End: 2021-10-26
Attending: INTERNAL MEDICINE
Payer: MEDICARE

## 2021-10-26 DIAGNOSIS — Z12.31 VISIT FOR SCREENING MAMMOGRAM: ICD-10-CM

## 2021-10-26 PROCEDURE — 77063 BREAST TOMOSYNTHESIS BI: CPT

## 2022-03-19 DIAGNOSIS — N39.0 URINARY TRACT INFECTION WITHOUT HEMATURIA, SITE UNSPECIFIED: ICD-10-CM

## 2022-03-19 NOTE — PROGRESS NOTES
Patient states that she needs a lab slip for the repeat urinalysis, she needs to pick that up since she will be going to [facility], I will print that order form for her and she can pick that up at the .

## 2022-03-24 ENCOUNTER — TELEPHONE (OUTPATIENT)
Dept: MEDICAL GROUP | Facility: MEDICAL CENTER | Age: 72
End: 2022-03-24
Payer: MEDICARE

## 2022-03-24 LAB
BACTERIA UR CULT: NO GROWTH
BACTERIA UR CULT: NORMAL
MICROALBUMIN UR-MCNC: 4.1 UG/ML

## 2022-03-25 ENCOUNTER — TELEPHONE (OUTPATIENT)
Dept: MEDICAL GROUP | Facility: MEDICAL CENTER | Age: 72
End: 2022-03-25
Payer: MEDICARE

## 2022-03-25 DIAGNOSIS — R39.9 URINARY SYMPTOM OR SIGN: ICD-10-CM

## 2022-03-25 NOTE — TELEPHONE ENCOUNTER
[facility] faxing requested results.    [facility] did not do a UA and they want to know if you want to do an add on?    Pt notified.     Pt states that she is still experiencing dysuria,  States she is miserable. Told her that you may possibly do a REF to [facility]RO. Please advise, she is awaiting instructions on what to do next.

## 2022-03-25 NOTE — TELEPHONE ENCOUNTER
Yes, the initial order was for a urinalysis which should have been done.  For the patient, let her know that the urine culture shows no infection.  If she is still having urinary symptoms I will place a referral to urology.    In the meantime, she could schedule appointment with myself or one of the providers in the office for her current symptoms, since I have not seen her since October.

## 2022-03-25 NOTE — TELEPHONE ENCOUNTER
(1) Please call [facility], patient had a urinalysis done, I see that the microalbumin test being negative and the urine culture being negative but I do not see the standard UA report, could they please send the urinalysis report to us?    (2) Please notify the patient that the urine test repeat shows no evidence of infection.

## 2022-03-25 NOTE — TELEPHONE ENCOUNTER
----- Message from Adeel Sepulveda M.D. sent at 3/24/2022  6:39 PM PDT -----  (1) Please call [facility], patient had a urinalysis done, I see that the microalbumin test being negative and the urine culture being negative but I do not see the standard UA report, could they please send the urinalysis report to us?    (2) Please notify the patient that the urine test repeat shows no evidence of infection.

## 2022-03-26 LAB
APPEARANCE UR: CLEAR
BACTERIA #/AREA URNS HPF: NORMAL /[HPF]
BILIRUB UR QL STRIP: NEGATIVE
CASTS URNS MICRO: NORMAL
CASTS URNS QL MICRO: NORMAL /LPF
COLOR UR: YELLOW
CRYSTALS URNS MICRO: NORMAL
EPI CELLS #/AREA URNS HPF: NORMAL /HPF (ref 0–10)
GLUCOSE UR QL STRIP: NEGATIVE
HGB UR QL STRIP: NEGATIVE
KETONES UR QL STRIP: NEGATIVE
LEUKOCYTE ESTERASE UR QL STRIP: ABNORMAL
MICRO URNS: ABNORMAL
MICRO URNS: ABNORMAL
MUCOUS THREADS URNS QL MICRO: NORMAL
NITRITE UR QL STRIP: NEGATIVE
PH UR STRIP: 5.5 [PH] (ref 5–7.5)
PROT UR QL STRIP: NEGATIVE
RBC #/AREA URNS HPF: NORMAL /HPF (ref 0–2)
RENAL EPI CELLS #/AREA URNS HPF: NORMAL /[HPF]
SP GR UR STRIP: 1 (ref 1–1.03)
T VAGINALIS URNS QL MICRO: NORMAL
UNIDENT CRYS URNS QL MICRO: NORMAL
URNS CMNT MICRO: NORMAL
UROBILINOGEN UR STRIP-MCNC: 0.2 MG/DL (ref 0.2–1)
WBC #/AREA URNS HPF: NORMAL /HPF (ref 0–5)
WRITTEN AUTHORIZATION   977900: NORMAL
YEAST #/AREA URNS HPF: NORMAL /[HPF]

## 2022-03-27 ENCOUNTER — TELEPHONE (OUTPATIENT)
Dept: MEDICAL GROUP | Facility: MEDICAL CENTER | Age: 72
End: 2022-03-27
Payer: MEDICARE

## 2022-03-28 ENCOUNTER — TELEPHONE (OUTPATIENT)
Dept: MEDICAL GROUP | Facility: MEDICAL CENTER | Age: 72
End: 2022-03-28
Payer: MEDICARE

## 2022-03-28 NOTE — TELEPHONE ENCOUNTER
Please notify the patient that [facility] was able to do the urinalysis and it shows no evidence of blood, and her initial urine study showed no evidence of infection.

## 2022-03-28 NOTE — TELEPHONE ENCOUNTER
----- Message from Adeel Sepulveda M.D. sent at 3/27/2022  7:35 PM PDT -----  Please notify the patient that [facility] was able to do the urinalysis and it shows no evidence of blood, and her initial urine study showed no evidence of infection.

## 2022-10-17 SDOH — ECONOMIC STABILITY: INCOME INSECURITY: IN THE LAST 12 MONTHS, WAS THERE A TIME WHEN YOU WERE NOT ABLE TO PAY THE MORTGAGE OR RENT ON TIME?: NO

## 2022-10-17 SDOH — ECONOMIC STABILITY: HOUSING INSECURITY: IN THE LAST 12 MONTHS, HOW MANY PLACES HAVE YOU LIVED?: 1

## 2022-10-17 SDOH — ECONOMIC STABILITY: HOUSING INSECURITY
IN THE LAST 12 MONTHS, WAS THERE A TIME WHEN YOU DID NOT HAVE A STEADY PLACE TO SLEEP OR SLEPT IN A SHELTER (INCLUDING NOW)?: NO

## 2022-10-17 SDOH — ECONOMIC STABILITY: INCOME INSECURITY: HOW HARD IS IT FOR YOU TO PAY FOR THE VERY BASICS LIKE FOOD, HOUSING, MEDICAL CARE, AND HEATING?: NOT HARD AT ALL

## 2022-10-17 SDOH — ECONOMIC STABILITY: TRANSPORTATION INSECURITY
IN THE PAST 12 MONTHS, HAS THE LACK OF TRANSPORTATION KEPT YOU FROM MEDICAL APPOINTMENTS OR FROM GETTING MEDICATIONS?: NO

## 2022-10-17 SDOH — ECONOMIC STABILITY: TRANSPORTATION INSECURITY
IN THE PAST 12 MONTHS, HAS LACK OF RELIABLE TRANSPORTATION KEPT YOU FROM MEDICAL APPOINTMENTS, MEETINGS, WORK OR FROM GETTING THINGS NEEDED FOR DAILY LIVING?: NO

## 2022-10-17 SDOH — HEALTH STABILITY: PHYSICAL HEALTH: ON AVERAGE, HOW MANY DAYS PER WEEK DO YOU ENGAGE IN MODERATE TO STRENUOUS EXERCISE (LIKE A BRISK WALK)?: 5 DAYS

## 2022-10-17 SDOH — HEALTH STABILITY: PHYSICAL HEALTH: ON AVERAGE, HOW MANY MINUTES DO YOU ENGAGE IN EXERCISE AT THIS LEVEL?: 40 MIN

## 2022-10-17 SDOH — ECONOMIC STABILITY: FOOD INSECURITY: WITHIN THE PAST 12 MONTHS, YOU WORRIED THAT YOUR FOOD WOULD RUN OUT BEFORE YOU GOT MONEY TO BUY MORE.: NEVER TRUE

## 2022-10-17 SDOH — ECONOMIC STABILITY: FOOD INSECURITY: WITHIN THE PAST 12 MONTHS, THE FOOD YOU BOUGHT JUST DIDN'T LAST AND YOU DIDN'T HAVE MONEY TO GET MORE.: NEVER TRUE

## 2022-10-17 SDOH — HEALTH STABILITY: MENTAL HEALTH
STRESS IS WHEN SOMEONE FEELS TENSE, NERVOUS, ANXIOUS, OR CAN'T SLEEP AT NIGHT BECAUSE THEIR MIND IS TROUBLED. HOW STRESSED ARE YOU?: NOT AT ALL

## 2022-10-17 SDOH — ECONOMIC STABILITY: TRANSPORTATION INSECURITY
IN THE PAST 12 MONTHS, HAS LACK OF TRANSPORTATION KEPT YOU FROM MEETINGS, WORK, OR FROM GETTING THINGS NEEDED FOR DAILY LIVING?: NO

## 2022-10-17 ASSESSMENT — SOCIAL DETERMINANTS OF HEALTH (SDOH)
DO YOU BELONG TO ANY CLUBS OR ORGANIZATIONS SUCH AS CHURCH GROUPS UNIONS, FRATERNAL OR ATHLETIC GROUPS, OR SCHOOL GROUPS?: YES
DO YOU BELONG TO ANY CLUBS OR ORGANIZATIONS SUCH AS CHURCH GROUPS UNIONS, FRATERNAL OR ATHLETIC GROUPS, OR SCHOOL GROUPS?: YES
IN A TYPICAL WEEK, HOW MANY TIMES DO YOU TALK ON THE PHONE WITH FAMILY, FRIENDS, OR NEIGHBORS?: MORE THAN THREE TIMES A WEEK
HOW OFTEN DO YOU GET TOGETHER WITH FRIENDS OR RELATIVES?: ONCE A WEEK
HOW OFTEN DO YOU HAVE SIX OR MORE DRINKS ON ONE OCCASION: NEVER
HOW OFTEN DO YOU ATTEND CHURCH OR RELIGIOUS SERVICES?: MORE THAN 4 TIMES PER YEAR
HOW OFTEN DO YOU HAVE A DRINK CONTAINING ALCOHOL: NEVER
HOW HARD IS IT FOR YOU TO PAY FOR THE VERY BASICS LIKE FOOD, HOUSING, MEDICAL CARE, AND HEATING?: NOT HARD AT ALL
HOW OFTEN DO YOU GET TOGETHER WITH FRIENDS OR RELATIVES?: ONCE A WEEK
IN A TYPICAL WEEK, HOW MANY TIMES DO YOU TALK ON THE PHONE WITH FAMILY, FRIENDS, OR NEIGHBORS?: MORE THAN THREE TIMES A WEEK
HOW OFTEN DO YOU ATTEND CHURCH OR RELIGIOUS SERVICES?: MORE THAN 4 TIMES PER YEAR
HOW OFTEN DO YOU ATTENT MEETINGS OF THE CLUB OR ORGANIZATION YOU BELONG TO?: MORE THAN 4 TIMES PER YEAR
WITHIN THE PAST 12 MONTHS, YOU WORRIED THAT YOUR FOOD WOULD RUN OUT BEFORE YOU GOT THE MONEY TO BUY MORE: NEVER TRUE
HOW OFTEN DO YOU ATTENT MEETINGS OF THE CLUB OR ORGANIZATION YOU BELONG TO?: MORE THAN 4 TIMES PER YEAR
HOW MANY DRINKS CONTAINING ALCOHOL DO YOU HAVE ON A TYPICAL DAY WHEN YOU ARE DRINKING: PATIENT DOES NOT DRINK

## 2022-10-17 ASSESSMENT — LIFESTYLE VARIABLES
AUDIT-C TOTAL SCORE: 0
SKIP TO QUESTIONS 9-10: 1
HOW OFTEN DO YOU HAVE SIX OR MORE DRINKS ON ONE OCCASION: NEVER
HOW MANY STANDARD DRINKS CONTAINING ALCOHOL DO YOU HAVE ON A TYPICAL DAY: PATIENT DOES NOT DRINK
HOW OFTEN DO YOU HAVE A DRINK CONTAINING ALCOHOL: NEVER

## 2022-10-20 ENCOUNTER — OFFICE VISIT (OUTPATIENT)
Dept: MEDICAL GROUP | Facility: MEDICAL CENTER | Age: 72
End: 2022-10-20
Payer: MEDICARE

## 2022-10-20 VITALS
WEIGHT: 176 LBS | TEMPERATURE: 97.9 F | DIASTOLIC BLOOD PRESSURE: 64 MMHG | HEIGHT: 67 IN | OXYGEN SATURATION: 97 % | BODY MASS INDEX: 27.62 KG/M2 | SYSTOLIC BLOOD PRESSURE: 138 MMHG | HEART RATE: 75 BPM

## 2022-10-20 DIAGNOSIS — Z00.00 PREVENTATIVE HEALTH CARE: Chronic | ICD-10-CM

## 2022-10-20 DIAGNOSIS — R73.09 IMPAIRED GLUCOSE METABOLISM: ICD-10-CM

## 2022-10-20 DIAGNOSIS — I10 PRIMARY HYPERTENSION: ICD-10-CM

## 2022-10-20 DIAGNOSIS — Z00.00 MEDICARE ANNUAL WELLNESS VISIT, SUBSEQUENT: ICD-10-CM

## 2022-10-20 DIAGNOSIS — E78.5 DYSLIPIDEMIA: ICD-10-CM

## 2022-10-20 PROCEDURE — G0439 PPPS, SUBSEQ VISIT: HCPCS | Performed by: INTERNAL MEDICINE

## 2022-10-20 ASSESSMENT — PATIENT HEALTH QUESTIONNAIRE - PHQ9: CLINICAL INTERPRETATION OF PHQ2 SCORE: 0

## 2022-10-20 ASSESSMENT — FIBROSIS 4 INDEX: FIB4 SCORE: 1.5

## 2022-10-20 ASSESSMENT — ENCOUNTER SYMPTOMS: GENERAL WELL-BEING: EXCELLENT

## 2022-10-20 ASSESSMENT — ACTIVITIES OF DAILY LIVING (ADL): BATHING_REQUIRES_ASSISTANCE: 0

## 2022-10-20 NOTE — PROGRESS NOTES
Subjective     Sandee Grimm is a 72 y.o. female who presents with medicare wellness          HPI        Medicare wellness  Current supplements: Reviewed  Chronic narcotic pain medicines: No  Allergies:  reviewed  Blood pressure runs 120s/70s   Exercise 5 days regularly at home aerobics, 40 minutes   Water and green tea  Screening: Reviewed  Depressive Symptoms: Denies feeling down, depressed or hopeless. Denies loss of interest or pleasure in doing things   ADLs: Denies needing help with using telephone, transportation, shopping, preparing meals, housework, laundry, or managing medication or money.    Independent with bathing, hygiene, feeding, toileting, dressing    Memory concerns: Denies difficulty remembering details of conversations, events and upcoming appointments.  Hearing problems: Denies.   Recent falls no    Current social contact/activities: Reviewed    Current Outpatient Medications   Medication Sig Dispense Refill    hydrochlorothiazide (MICROZIDE) 12.5 MG capsule Take 1 Capsule by mouth every day. 90 Capsule 0    ramipril (ALTACE) 10 MG capsule Take 1 Capsule by mouth 2 times a day. 180 Capsule 0    atorvastatin (LIPITOR) 20 MG Tab Take 1 Tablet by mouth every day. 90 Tablet 0     No current facility-administered medications for this visit.                    Tubular adenoma  10/29/13 colon per [facility] tubular adenoma, repeat in 5 yrs  1/30/20 colon per [facility] negative repeat 10 years       tear meniscus  7/12 MRI left knee  medial meniscal posterior horn/body tear      Shoulder pain  10/19/20 x-ray left shoulder, if negative consider physical therapy  10/22/20 x-ray left shoulder mild osteoarthritis, offered PT she declines     Preventive health  8/16/12 tdap   9/16/14 pneumovax   [facility]  10/19/18 dexa LS-1.7,hip-0.5  10/18/19 hep c ab negative  1/30/20 colon per [facility] negative repeat 10 years  41/5/21 covid pfizer second  10/21/21 vit d 62  10/26/21 mammogram        Hypertension  1/06 exercise thallium no evidence of ischemia ejection fraction 80%  1/06 echocardiogram normal left ventricular function  8/11 on altace 10 bid, add norvasc 5 mg  7/12 stopped norvasc due to leg swelling, on altace 10 mg bid  8/12 urine mac 4.8; on altace 10 mg bid and start hctz 12.5 mg   10/6/15 urine mac <5.8 on altace 10 mg bid and hctz 12.5 mg  9/24/16 urine mac 3.5 on altace 10 mg bid and hctz 12.5 mg  9/30/17 urine mac <2.7 on altace 10 mg bid and hctz 12.5 mg  10/18/19 urine mac 2.6 on altace 10 mg bid and hctz 12.5 mg  10/23/20 urine mac<4 on altace 10 mg bid and hctz 12.5 mg  10/21/21 urine mac 4 on altace 10 mg bid and hctz 12.5 mg  3/22/22 urine mac 4 on altace 10 mg bid and hctz 12.5 mg  (intolerant norvasc)     Dyslipidemia  12/08 chol 162, trig 192,hdl 48,ldl 76, CRP 1  2/10 chol 143,trig 154,hdl 45,ldl 67 on lipitor 40 mg  8/11 chol 148,trig 141,hdl 52,ldl 68 on lipitor 40 mg  8/12 chol 122,trig 136,hdl 40,ldl 55, decrease lipitor to 20 mg (40 mg 1/2 per day)  9/26/13 chol 142,trig 124,hdl 50,ldl 67 on lipitor 20 mg (1/2 40 mg tab)  9/16/14 off lipitor no side effects, just wanted to stop the lipitor  9/18/14 chol 229,trig 140,hdl 46,ldl 155 off lipitor; resume lipitor 20 mg daily repeat lipid in 6 weeks  10/16/15 chol 146,trig 160,hdl 51,ldl 63 on lipitor 20 mg  9/24/16 chol 152,trig 167,hdl 51,ldl 68 on lipitor 20 mg  9/30/17 chol 144,trig 138,hdl 52,ldl 64 on lipitor 20 mg  10/6/18 chol 30,trg 144,hdl 47,ldl 54 on lipitor 20 mg  10/18/19 chol 140,trig 153,hdl 49,ldl 60 on lipitor 20 mg  10/23/20 chol 144,trig 145,hdl 47,ldl 72 on lipitor 20 mg  10/21/21 chol 152,trig 151,hdl 51,ldl 75 on lipitor 20 mg     Patient Active Problem List   Diagnosis    Hypertension    Dyslipidemia    Family history of breast cancer    Preventative health care    Tear meniscus knee    Tubular adenoma of colon    Shoulder pain         ROS:    Ostomy or other tubes or amputations no  Chronic oxygen use  no  Eye exam annually   Gait: normal. Uses assistive device :  no       Health Care Screening recommendations reviewed with patient today and updated or ordered.  DPA/Advanced directive: Completed/Information provided.   Referrals for PT/OT/Nutrition counseling/Behavioral Health/Smoking cessation as above if indicated  Discussion today about general wellness and lifestyle habits:    Prevent falls and reduce trip hazards;   Have a working fire alarm and carbon monoxide detector;   Engage in regular physical activity and social activities;   Use sun protection when outdoors.     Depression Screening  Little interest or pleasure in doing things?  0 - not at all  Feeling down, depressed , or hopeless? 0 - not at all  Patient Health Questionnaire Score: 0     If depressive symptoms identified deferred to follow up visit unless specifically addressed in assessment and plan.    Interpretation of PHQ-9 Total Score   Score Severity   1-4 No Depression   5-9 Mild Depression   10-14 Moderate Depression   15-19 Moderately Severe Depression   20-27 Severe Depression    Screening for Cognitive Impairment  Three Minute Recall (daughter, heaven, mountain)  /3    Jeovany clock face with all 12 numbers and set the hands to show 10 past 11.  Yes    Cognitive concerns identified deferred for follow up unless specifically addressed in assessment and plan.    Fall Risk Assessment  Has the patient had two or more falls in the last year or any fall with injury in the last year?  No    Safety Assessment  Throw rugs on floor.  No  Handrails on all stairs.  No  Good lighting in all hallways.  Yes  Difficulty hearing.  No  Patient counseled about all safety risks that were identified.    Functional Assessment ADLs  Are there any barriers preventing you from cooking for yourself or meeting nutritional needs?  No.    Are there any barriers preventing you from driving safely or obtaining transportation?  No.    Are there any barriers preventing you  from using a telephone or calling for help?  No.    Are there any barriers preventing you from shopping?  No.    Are there any barriers preventing you from taking care of your own finances?  No.    Are there any barriers preventing you from managing your medications?  No.    Are there any barriers preventing you from showering, bathing or dressing yourself?  No.    Are you currently engaging in any exercise or physical activity?  Yes.     What is your perception of your health?  Excellent    Advance Care Planning  Do you have an Advance Directive, Living Will, Durable Power of , or POLST? No                       Patient Care Team:  Adeel Sepulveda M.D. as PCP - General  Blade Verduzco M.D. as Consulting Physician (Ophthalmology)  Helene Fox M.D. as Consulting Physician (Obstetrics & Gynecology)      ROS           Objective          Physical Exam  Vitals and nursing note reviewed.   Constitutional:       Appearance: Normal appearance.   HENT:      Head: Normocephalic and atraumatic.      Right Ear: External ear normal.      Left Ear: External ear normal.   Eyes:      Conjunctiva/sclera: Conjunctivae normal.   Cardiovascular:      Rate and Rhythm: Normal rate and regular rhythm.      Heart sounds: Normal heart sounds.   Pulmonary:      Effort: Pulmonary effort is normal.      Breath sounds: Normal breath sounds.   Abdominal:      General: There is no distension.   Skin:     General: Skin is warm.   Neurological:      General: No focal deficit present.      Mental Status: She is alert.   Psychiatric:         Mood and Affect: Mood normal.         Behavior: Behavior normal.                           Assessment & Plan        Assessment  #1 medicare wellness     #2  Hypertension on Altace, hydrochlorothiazide blood pressures have been stable at home    #3 dyslipidemia on Lipitor 10/21/21 chol 152,trig 151,hdl 51,ldl 75 on lipitor 20 mg    #4 postmenopausal bone loss          Plan  #1 health maintenance  reviewed and updated    #2 has had flu shot at the pharmacy    #3 she declines DTaP, she would like to get that at the pharmacy    #4 she will get the updated COVID-vaccine at the pharmacy    #5 mammogram scheduled    #6 declines bone density testing     #7 advanced directive paperwork provided advised the patient to obtain advanced directive for herself and her     #8 continue good nutrition exercise program    #9 declines hearing test    #10 labs ordered    #11 follow up one year, continue check blood pressure regularly, continue Altace, hydrochlorothiazide, Lipitor

## 2022-10-27 ENCOUNTER — HOSPITAL ENCOUNTER (OUTPATIENT)
Dept: RADIOLOGY | Facility: MEDICAL CENTER | Age: 72
End: 2022-10-27
Attending: INTERNAL MEDICINE
Payer: MEDICARE

## 2022-10-27 DIAGNOSIS — Z12.31 VISIT FOR SCREENING MAMMOGRAM: ICD-10-CM

## 2022-10-28 LAB
ALBUMIN SERPL-MCNC: 4.4 G/DL (ref 3.7–4.7)
ALBUMIN/CREAT UR: <5 MG/G CREAT (ref 0–29)
ALBUMIN/GLOB SERPL: 1.5 {RATIO} (ref 1.2–2.2)
ALP SERPL-CCNC: 89 IU/L (ref 44–121)
ALT SERPL-CCNC: 15 IU/L (ref 0–32)
APPEARANCE UR: CLEAR
AST SERPL-CCNC: 20 IU/L (ref 0–40)
BACTERIA #/AREA URNS HPF: NORMAL /[HPF]
BACTERIA UR CULT: NORMAL
BACTERIA UR CULT: NORMAL
BASOPHILS # BLD AUTO: 0.1 X10E3/UL (ref 0–0.2)
BASOPHILS NFR BLD AUTO: 1 %
BILIRUB SERPL-MCNC: 0.7 MG/DL (ref 0–1.2)
BILIRUB UR QL STRIP: NEGATIVE
BUN SERPL-MCNC: 14 MG/DL (ref 8–27)
BUN/CREAT SERPL: 15 (ref 12–28)
CALCIUM SERPL-MCNC: 9.8 MG/DL (ref 8.7–10.3)
CASTS URNS MICRO: NORMAL
CASTS URNS QL MICRO: NORMAL /LPF
CHLORIDE SERPL-SCNC: 105 MMOL/L (ref 96–106)
CHOLEST SERPL-MCNC: 184 MG/DL (ref 100–199)
CO2 SERPL-SCNC: 22 MMOL/L (ref 20–29)
COLOR UR: YELLOW
CREAT SERPL-MCNC: 0.91 MG/DL (ref 0.57–1)
CREAT UR-MCNC: 54.7 MG/DL
CRYSTALS URNS MICRO: NORMAL
EGFRCR SERPLBLD CKD-EPI 2021: 67 ML/MIN/1.73
EOSINOPHIL # BLD AUTO: 0.1 X10E3/UL (ref 0–0.4)
EOSINOPHIL NFR BLD AUTO: 2 %
EPI CELLS #/AREA URNS HPF: NORMAL /HPF (ref 0–10)
ERYTHROCYTE [DISTWIDTH] IN BLOOD BY AUTOMATED COUNT: 13 % (ref 11.7–15.4)
GLOBULIN SER CALC-MCNC: 3 G/DL (ref 1.5–4.5)
GLUCOSE SERPL-MCNC: 89 MG/DL (ref 70–99)
GLUCOSE UR QL STRIP: NEGATIVE
HBA1C MFR BLD: 5.9 % (ref 4.8–5.6)
HCT VFR BLD AUTO: 41.4 % (ref 34–46.6)
HDLC SERPL-MCNC: 54 MG/DL
HGB BLD-MCNC: 13.8 G/DL (ref 11.1–15.9)
HGB UR QL STRIP: NEGATIVE
IMM GRANULOCYTES # BLD AUTO: 0 X10E3/UL (ref 0–0.1)
IMM GRANULOCYTES NFR BLD AUTO: 0 %
IMMATURE CELLS  115398: NORMAL
KETONES UR QL STRIP: NEGATIVE
LABORATORY COMMENT REPORT: ABNORMAL
LDLC SERPL CALC-MCNC: 107 MG/DL (ref 0–99)
LEUKOCYTE ESTERASE UR QL STRIP: ABNORMAL
LYMPHOCYTES # BLD AUTO: 2 X10E3/UL (ref 0.7–3.1)
LYMPHOCYTES NFR BLD AUTO: 35 %
MCH RBC QN AUTO: 28.4 PG (ref 26.6–33)
MCHC RBC AUTO-ENTMCNC: 33.3 G/DL (ref 31.5–35.7)
MCV RBC AUTO: 85 FL (ref 79–97)
MICRO URNS: ABNORMAL
MICRO URNS: ABNORMAL
MICROALBUMIN UR-MCNC: <3 UG/ML
MONOCYTES # BLD AUTO: 0.5 X10E3/UL (ref 0.1–0.9)
MONOCYTES NFR BLD AUTO: 9 %
MORPHOLOGY BLD-IMP: NORMAL
MUCOUS THREADS URNS QL MICRO: NORMAL
NEUTROPHILS # BLD AUTO: 3 X10E3/UL (ref 1.4–7)
NEUTROPHILS NFR BLD AUTO: 53 %
NITRITE UR QL STRIP: NEGATIVE
NRBC BLD AUTO-RTO: NORMAL %
PH UR STRIP: 6 [PH] (ref 5–7.5)
PLATELET # BLD AUTO: 303 X10E3/UL (ref 150–450)
POTASSIUM SERPL-SCNC: 4.4 MMOL/L (ref 3.5–5.2)
PROT SERPL-MCNC: 7.4 G/DL (ref 6–8.5)
PROT UR QL STRIP: NEGATIVE
RBC # BLD AUTO: 4.86 X10E6/UL (ref 3.77–5.28)
RBC #/AREA URNS HPF: NORMAL /HPF (ref 0–2)
RENAL EPI CELLS #/AREA URNS HPF: NORMAL /[HPF]
SODIUM SERPL-SCNC: 141 MMOL/L (ref 134–144)
SP GR UR STRIP: 1.01 (ref 1–1.03)
T VAGINALIS URNS QL MICRO: NORMAL
TRIGL SERPL-MCNC: 128 MG/DL (ref 0–149)
TSH SERPL DL<=0.005 MIU/L-ACNC: 1.6 UIU/ML (ref 0.45–4.5)
UNIDENT CRYS URNS QL MICRO: NORMAL
URINALYSIS REFLEX  377202: ABNORMAL
URNS CMNT MICRO: NORMAL
UROBILINOGEN UR STRIP-MCNC: 0.2 MG/DL (ref 0.2–1)
VLDLC SERPL CALC-MCNC: 23 MG/DL (ref 5–40)
WBC # BLD AUTO: 5.6 X10E3/UL (ref 3.4–10.8)
WBC #/AREA URNS HPF: NORMAL /HPF (ref 0–5)
YEAST #/AREA URNS HPF: NORMAL /[HPF]

## 2022-10-29 ENCOUNTER — TELEPHONE (OUTPATIENT)
Dept: MEDICAL GROUP | Facility: MEDICAL CENTER | Age: 72
End: 2022-10-29
Payer: MEDICARE

## 2022-10-29 NOTE — TELEPHONE ENCOUNTER
Notified with labs.  Cholesterol slightly increased from last year.  Continue statin no changes.  A1c borderline, no medications necessary continue to work on a good nutrition and exercise program.

## 2022-11-11 ENCOUNTER — PATIENT MESSAGE (OUTPATIENT)
Dept: HEALTH INFORMATION MANAGEMENT | Facility: OTHER | Age: 72
End: 2022-11-11

## 2022-12-15 ENCOUNTER — HOSPITAL ENCOUNTER (OUTPATIENT)
Dept: RADIOLOGY | Facility: MEDICAL CENTER | Age: 72
End: 2022-12-15
Attending: INTERNAL MEDICINE
Payer: MEDICARE

## 2022-12-15 PROCEDURE — 77063 BREAST TOMOSYNTHESIS BI: CPT

## 2022-12-16 ENCOUNTER — TELEPHONE (OUTPATIENT)
Dept: MEDICAL GROUP | Facility: MEDICAL CENTER | Age: 72
End: 2022-12-16
Payer: MEDICARE

## 2022-12-16 NOTE — TELEPHONE ENCOUNTER
----- Message from Adeel Sepulveda M.D. sent at 12/16/2022  3:09 PM PST -----  Please notify the patient that:  (1) her mammogram is negative but does show dense breast tissue which may decrease the accuracy of the mammogram  (2) she may obtain a screening breast ultrasound which could provide further detail  (3) [facility] no longer offers the screening breast ultrasound, thus if she is interested in having that done, she can have that at the [facility] if that imaging center is covered under her insurance plan  (3) although her insurance may not cover the screening breast ultrasound, if that is the case, then the out of pocket cost is approximately $125  (4) please let me know if she is interested in obtaining the screening breast ultrasound as we would need to send the order to [facility]

## 2022-12-16 NOTE — TELEPHONE ENCOUNTER
Please notify the patient that:  (1) her mammogram is negative but does show dense breast tissue which may decrease the accuracy of the mammogram  (2) she may obtain a screening breast ultrasound which could provide further detail  (3) [facility] no longer offers the screening breast ultrasound, thus if she is interested in having that done, she can have that at the [facility] if that imaging center is covered under her insurance plan  (3) although her insurance may not cover the screening breast ultrasound, if that is the case, then the out of pocket cost is approximately $125  (4) please let me know if she is interested in obtaining the screening breast ultrasound as we would need to send the order to [facility]

## 2022-12-22 NOTE — TELEPHONE ENCOUNTER
Please notify the patient we will fax the breast ultrasound order to [facility].    If she does not hear from them in 1 week to let us know.

## 2023-01-27 ENCOUNTER — TELEPHONE (OUTPATIENT)
Dept: MEDICAL GROUP | Facility: MEDICAL CENTER | Age: 73
End: 2023-01-27
Payer: MEDICARE

## 2023-01-27 NOTE — TELEPHONE ENCOUNTER
Please notify the patient that her screening breast ultrasound at [facility] is negative, she can continue with annual mammography

## 2023-01-28 NOTE — TELEPHONE ENCOUNTER
----- Message from Adeel Sepulveda M.D. sent at 1/27/2023  2:08 PM PST -----  Please notify the patient that her screening breast ultrasound at [facility] is negative, she can continue with annual mammography

## 2023-02-24 ENCOUNTER — OFFICE VISIT (OUTPATIENT)
Dept: URGENT CARE | Facility: CLINIC | Age: 73
End: 2023-02-24
Payer: MEDICARE

## 2023-02-24 VITALS
SYSTOLIC BLOOD PRESSURE: 132 MMHG | HEART RATE: 72 BPM | WEIGHT: 175 LBS | OXYGEN SATURATION: 98 % | HEIGHT: 67 IN | RESPIRATION RATE: 16 BRPM | TEMPERATURE: 98.2 F | BODY MASS INDEX: 27.47 KG/M2 | DIASTOLIC BLOOD PRESSURE: 72 MMHG

## 2023-02-24 DIAGNOSIS — U07.1 COVID: ICD-10-CM

## 2023-02-24 PROCEDURE — 99213 OFFICE O/P EST LOW 20 MIN: CPT

## 2023-02-24 ASSESSMENT — FIBROSIS 4 INDEX: FIB4 SCORE: 1.24

## 2023-02-25 NOTE — PROGRESS NOTES
"Subjective:   Sandee Grimm is a 72 y.o. female who presents for Cough (X 4 days, cough, tested positive for covid today, requesting medication.)      HPI:    Patient presents with her  to urgent care for COVID antiviral medications.  Patient reports she tested positive today for COVID via the home COVID test  Patient states she started having fatigue 4 days ago, followed by nasal congestion, runny nose, cough 2 days ago.  Denies shortness of breath, wheezing, chest tightness  Denies fever, chills, nausea, vomiting, diarrhea  Reports adequate oral intake and urinary output        ROS As above in HPI    Medications:    Current Outpatient Medications on File Prior to Visit   Medication Sig Dispense Refill    ramipril (ALTACE) 10 MG capsule Take 1 Capsule by mouth 2 times a day. 180 Capsule 2    hydrochlorothiazide (MICROZIDE) 12.5 MG capsule Take 1 Capsule by mouth every day. 90 Capsule 2    atorvastatin (LIPITOR) 20 MG Tab Take 1 Tablet by mouth every day. 90 Tablet 2     No current facility-administered medications on file prior to visit.        Allergies:   Erythromycin and Norvasc [amlodipine besylate]    Problem List:   Patient Active Problem List   Diagnosis    Hypertension    Dyslipidemia    Family history of breast cancer    Preventative health care    Tear meniscus knee    Tubular adenoma of colon    Shoulder pain        Surgical History:  No past surgical history on file.    Past Social Hx:   Social History     Tobacco Use    Smoking status: Never    Smokeless tobacco: Never   Vaping Use    Vaping Use: Never used   Substance Use Topics    Alcohol use: Not Currently     Comment: rare    Drug use: No          Problem list, medications, and allergies reviewed by myself today in Epic.     Objective:     /72   Pulse 72   Temp 36.8 °C (98.2 °F) (Temporal)   Resp 16   Ht 1.702 m (5' 7\")   Wt 79.4 kg (175 lb)   SpO2 98%   BMI 27.41 kg/m²     Physical Exam  Vitals reviewed. "   Constitutional:       Appearance: Normal appearance.   HENT:      Head: Normocephalic and atraumatic.      Right Ear: Tympanic membrane and ear canal normal.      Left Ear: Tympanic membrane and ear canal normal.      Nose: Congestion and rhinorrhea present.      Mouth/Throat:      Mouth: Mucous membranes are moist.      Pharynx: Oropharynx is clear. Posterior oropharyngeal erythema present. No oropharyngeal exudate.   Eyes:      Conjunctiva/sclera: Conjunctivae normal.      Pupils: Pupils are equal, round, and reactive to light.   Cardiovascular:      Rate and Rhythm: Normal rate and regular rhythm.      Heart sounds: Normal heart sounds.   Pulmonary:      Effort: Pulmonary effort is normal. No respiratory distress.      Breath sounds: Normal breath sounds. No stridor. No wheezing, rhonchi or rales.   Chest:      Chest wall: No tenderness.   Abdominal:      General: Bowel sounds are normal.      Palpations: Abdomen is soft.   Skin:     General: Skin is warm and dry.      Capillary Refill: Capillary refill takes less than 2 seconds.      Findings: No rash.   Neurological:      Mental Status: She is alert and oriented to person, place, and time.       Assessment/Plan:     Diagnosis and associated orders:   1. COVID  - molnupiravir 200 MG capsule; Take 4 Capsules by mouth 2 times a day for 5 days.  Dispense: 40 Capsule; Refill: 0        Comments/MDM:     Antiviral medication side effects, adverse effects, and rebound illness reviewed with patient.  She verbalized understanding consented to the use of molnupiravir.    Supportive measures encouraged: Rest, increased oral hydration, NSAIDs/tylenol as needed per package instructions, decongestant, warm humidification, otc cough suppressant as needed.   Follow up with PCP           Please note that this dictation was created using voice recognition software. I have made a reasonable attempt to correct obvious errors, but I expect that there are errors of grammar and  possibly content that I did not discover before finalizing the note.    This note was electronically signed by Cherelle uHggins DNP

## 2023-03-14 ENCOUNTER — HOSPITAL ENCOUNTER (OUTPATIENT)
Dept: RADIOLOGY | Facility: MEDICAL CENTER | Age: 73
End: 2023-03-14
Payer: MEDICARE

## 2023-10-16 SDOH — ECONOMIC STABILITY: FOOD INSECURITY: WITHIN THE PAST 12 MONTHS, YOU WORRIED THAT YOUR FOOD WOULD RUN OUT BEFORE YOU GOT MONEY TO BUY MORE.: NEVER TRUE

## 2023-10-16 SDOH — HEALTH STABILITY: PHYSICAL HEALTH: ON AVERAGE, HOW MANY MINUTES DO YOU ENGAGE IN EXERCISE AT THIS LEVEL?: 40 MIN

## 2023-10-16 SDOH — ECONOMIC STABILITY: HOUSING INSECURITY: IN THE LAST 12 MONTHS, HOW MANY PLACES HAVE YOU LIVED?: 1

## 2023-10-16 SDOH — HEALTH STABILITY: PHYSICAL HEALTH: ON AVERAGE, HOW MANY DAYS PER WEEK DO YOU ENGAGE IN MODERATE TO STRENUOUS EXERCISE (LIKE A BRISK WALK)?: 5 DAYS

## 2023-10-16 SDOH — ECONOMIC STABILITY: INCOME INSECURITY: IN THE LAST 12 MONTHS, WAS THERE A TIME WHEN YOU WERE NOT ABLE TO PAY THE MORTGAGE OR RENT ON TIME?: NO

## 2023-10-16 SDOH — ECONOMIC STABILITY: FOOD INSECURITY: WITHIN THE PAST 12 MONTHS, THE FOOD YOU BOUGHT JUST DIDN'T LAST AND YOU DIDN'T HAVE MONEY TO GET MORE.: NEVER TRUE

## 2023-10-16 SDOH — ECONOMIC STABILITY: INCOME INSECURITY: HOW HARD IS IT FOR YOU TO PAY FOR THE VERY BASICS LIKE FOOD, HOUSING, MEDICAL CARE, AND HEATING?: NOT HARD AT ALL

## 2023-10-16 ASSESSMENT — SOCIAL DETERMINANTS OF HEALTH (SDOH)
HOW OFTEN DO YOU HAVE SIX OR MORE DRINKS ON ONE OCCASION: NEVER
HOW OFTEN DO YOU ATTEND CHURCH OR RELIGIOUS SERVICES?: MORE THAN 4 TIMES PER YEAR
HOW OFTEN DO YOU GET TOGETHER WITH FRIENDS OR RELATIVES?: ONCE A WEEK
HOW HARD IS IT FOR YOU TO PAY FOR THE VERY BASICS LIKE FOOD, HOUSING, MEDICAL CARE, AND HEATING?: NOT HARD AT ALL
DO YOU BELONG TO ANY CLUBS OR ORGANIZATIONS SUCH AS CHURCH GROUPS UNIONS, FRATERNAL OR ATHLETIC GROUPS, OR SCHOOL GROUPS?: YES
DO YOU BELONG TO ANY CLUBS OR ORGANIZATIONS SUCH AS CHURCH GROUPS UNIONS, FRATERNAL OR ATHLETIC GROUPS, OR SCHOOL GROUPS?: YES
IN A TYPICAL WEEK, HOW MANY TIMES DO YOU TALK ON THE PHONE WITH FAMILY, FRIENDS, OR NEIGHBORS?: MORE THAN THREE TIMES A WEEK
IN A TYPICAL WEEK, HOW MANY TIMES DO YOU TALK ON THE PHONE WITH FAMILY, FRIENDS, OR NEIGHBORS?: MORE THAN THREE TIMES A WEEK
HOW OFTEN DO YOU ATTEND CHURCH OR RELIGIOUS SERVICES?: MORE THAN 4 TIMES PER YEAR
HOW OFTEN DO YOU ATTENT MEETINGS OF THE CLUB OR ORGANIZATION YOU BELONG TO?: MORE THAN 4 TIMES PER YEAR
HOW OFTEN DO YOU HAVE A DRINK CONTAINING ALCOHOL: NEVER
WITHIN THE PAST 12 MONTHS, YOU WORRIED THAT YOUR FOOD WOULD RUN OUT BEFORE YOU GOT THE MONEY TO BUY MORE: NEVER TRUE
HOW MANY DRINKS CONTAINING ALCOHOL DO YOU HAVE ON A TYPICAL DAY WHEN YOU ARE DRINKING: PATIENT DOES NOT DRINK
HOW OFTEN DO YOU ATTENT MEETINGS OF THE CLUB OR ORGANIZATION YOU BELONG TO?: MORE THAN 4 TIMES PER YEAR
HOW OFTEN DO YOU GET TOGETHER WITH FRIENDS OR RELATIVES?: ONCE A WEEK

## 2023-10-16 ASSESSMENT — LIFESTYLE VARIABLES
HOW OFTEN DO YOU HAVE SIX OR MORE DRINKS ON ONE OCCASION: NEVER
AUDIT-C TOTAL SCORE: 0
HOW MANY STANDARD DRINKS CONTAINING ALCOHOL DO YOU HAVE ON A TYPICAL DAY: PATIENT DOES NOT DRINK
HOW OFTEN DO YOU HAVE A DRINK CONTAINING ALCOHOL: NEVER
SKIP TO QUESTIONS 9-10: 1

## 2023-10-21 PROBLEM — Z86.16 HISTORY OF COVID-19: Status: ACTIVE | Noted: 2023-10-21

## 2023-10-23 ENCOUNTER — OFFICE VISIT (OUTPATIENT)
Dept: MEDICAL GROUP | Facility: MEDICAL CENTER | Age: 73
End: 2023-10-23
Payer: MEDICARE

## 2023-10-23 VITALS
WEIGHT: 177 LBS | HEIGHT: 67 IN | HEART RATE: 84 BPM | BODY MASS INDEX: 27.78 KG/M2 | SYSTOLIC BLOOD PRESSURE: 126 MMHG | TEMPERATURE: 98.2 F | DIASTOLIC BLOOD PRESSURE: 70 MMHG | RESPIRATION RATE: 16 BRPM | OXYGEN SATURATION: 99 %

## 2023-10-23 DIAGNOSIS — Z12.31 ENCOUNTER FOR SCREENING MAMMOGRAM FOR BREAST CANCER: ICD-10-CM

## 2023-10-23 DIAGNOSIS — R73.09 IMPAIRED GLUCOSE METABOLISM: ICD-10-CM

## 2023-10-23 DIAGNOSIS — I10 PRIMARY HYPERTENSION: Chronic | ICD-10-CM

## 2023-10-23 DIAGNOSIS — E55.9 VITAMIN D DEFICIENCY: ICD-10-CM

## 2023-10-23 DIAGNOSIS — E78.5 DYSLIPIDEMIA: ICD-10-CM

## 2023-10-23 DIAGNOSIS — M81.0 POSTMENOPAUSAL BONE LOSS: ICD-10-CM

## 2023-10-23 DIAGNOSIS — E78.5 DYSLIPIDEMIA: Chronic | ICD-10-CM

## 2023-10-23 DIAGNOSIS — I10 ESSENTIAL HYPERTENSION: ICD-10-CM

## 2023-10-23 DIAGNOSIS — Z00.00 MEDICARE ANNUAL WELLNESS VISIT, SUBSEQUENT: ICD-10-CM

## 2023-10-23 PROCEDURE — 3074F SYST BP LT 130 MM HG: CPT | Performed by: INTERNAL MEDICINE

## 2023-10-23 PROCEDURE — G0439 PPPS, SUBSEQ VISIT: HCPCS | Performed by: INTERNAL MEDICINE

## 2023-10-23 PROCEDURE — 3078F DIAST BP <80 MM HG: CPT | Performed by: INTERNAL MEDICINE

## 2023-10-23 RX ORDER — RAMIPRIL 10 MG/1
10 CAPSULE ORAL 2 TIMES DAILY
Qty: 180 CAPSULE | Refills: 3 | Status: SHIPPED | OUTPATIENT
Start: 2023-10-23

## 2023-10-23 RX ORDER — HYDROCHLOROTHIAZIDE 12.5 MG/1
12.5 CAPSULE, GELATIN COATED ORAL DAILY
Qty: 90 CAPSULE | Refills: 3 | Status: SHIPPED | OUTPATIENT
Start: 2023-10-23

## 2023-10-23 RX ORDER — ATORVASTATIN CALCIUM 20 MG/1
20 TABLET, FILM COATED ORAL DAILY
Qty: 90 TABLET | Refills: 3 | Status: SHIPPED | OUTPATIENT
Start: 2023-10-23

## 2023-10-23 ASSESSMENT — PATIENT HEALTH QUESTIONNAIRE - PHQ9: CLINICAL INTERPRETATION OF PHQ2 SCORE: 0

## 2023-10-23 ASSESSMENT — FIBROSIS 4 INDEX: FIB4 SCORE: 1.26

## 2023-10-23 ASSESSMENT — ACTIVITIES OF DAILY LIVING (ADL): BATHING_REQUIRES_ASSISTANCE: 0

## 2023-10-23 ASSESSMENT — ENCOUNTER SYMPTOMS: GENERAL WELL-BEING: GOOD

## 2023-10-23 NOTE — PROGRESS NOTES
Subjective     Sandee Grimm is a 73 y.o. female who presents with medicare wellness          HPI            Medicare wellness  Current supplements: Reviewed  Chronic narcotic pain medicines: No  Medications, allergies, medical history, surgical history, social history, family history  reviewed and updated  Eye exam annually glasses for driving  Teeth cleaning twice per year  No hearing changes  Allergies:  reviewed  Brother is doing better in [place] and she visits him periodically.  Patient's  just underwent laparoscopic cholecystectomy and is doing well at home recovering.  Screening: Reviewed  Depressive Symptoms: Denies feeling down, depressed or hopeless. Denies loss of interest or pleasure in doing things   ADLs: Denies needing help with using telephone, transportation, shopping, preparing meals, housework, laundry, or managing medication or money.    Independent with bathing, hygiene, feeding, toileting, dressing , no incontinence bowel or bladder   Memory concerns: Denies difficulty remembering details of conversations, events and upcoming appointments.  Hearing problems: Denies.   Recent falls no  Does video exercise 5 days per week 30 minutes, tries to keep active, no joint pain or aches with exercise.  No chest pain, shortness of breath, palpitations.  Tries to ensure adequate hydration with water. Tries to eat a healthy diet and minimizes sweets and enjoys candy corn and peanuts around [facility].  No soda, does drink water and green tea, etoh none  Current social contact/activities: Reviewed  Had flu shot at [facility] and not had the covid vaccine      Current Outpatient Medications   Medication Sig Dispense Refill    hydrochlorothiazide (MICROZIDE) 12.5 MG capsule TAKE 1 CAPSULE BY MOUTH DAILY 90 Capsule 0    atorvastatin (LIPITOR) 20 MG Tab TAKE 1 TABLET BY MOUTH DAILY 90 Tablet 0    ramipril (ALTACE) 10 MG capsule TAKE 1 CAPSULE BY MOUTH TWICE  DAILY 180 Capsule 0     No current  facility-administered medications for this visit.              Tubular adenoma  10/29/13 colon per [facility] tubular adenoma, repeat in 5 yrs  1/30/20 colon per [facility] negative repeat 10 years       tear meniscus  7/12 MRI left knee  medial meniscal posterior horn/body tear      Shoulder pain  10/19/20 x-ray left shoulder, if negative consider physical therapy  10/22/20 x-ray left shoulder mild osteoarthritis, offered PT she declines     Preventive health  8/16/12 tdap   9/16/14 pneumovax   10/15/15 [facility]  10/19/18 dexa LS-1.7,hip-0.5  10/18/19 hep c ab negative  1/30/20 colon per [facility] negative repeat 10 years  10/21/21 vit d 62  10/20/22 declines tdap  10/20/22 declines dexa   10/28/22 A1c 5.9%  12/16/22 mammogram heterogeneously dense breast tissue recommend screening breast ultrasound  1/24/23 screening breast ultrasound at [facility] negative     Hypertension  1/06 exercise thallium no evidence of ischemia ejection fraction 80%  1/06 echocardiogram normal left ventricular function  8/11 on altace 10 bid, add norvasc 5 mg  7/12 stopped norvasc due to leg swelling, on altace 10 mg bid  8/12 urine mac 4.8; on altace 10 mg bid and start hctz 12.5 mg   10/6/15 urine mac <5.8 on altace 10 mg bid and hctz 12.5 mg  9/24/16 urine mac 3.5 on altace 10 mg bid and hctz 12.5 mg  9/30/17 urine mac <2.7 on altace 10 mg bid and hctz 12.5 mg  10/18/19 urine mac 2.6 on altace 10 mg bid and hctz 12.5 mg  10/23/20 urine mac<4 on altace 10 mg bid and hctz 12.5 mg  10/21/21 urine mac 4 on altace 10 mg bid and hctz 12.5 mg  3/22/22 urine mac 4 on altace 10 mg bid and hctz 12.5 mg  10/28/22 urine mac<3.0 on altace 10 mg bid and hctz 12.5 mg  (intolerant norvasc)    history covid  2/4/23 covid positive in urgent care     Dyslipidemia  12/08 chol 162, trig 192,hdl 48,ldl 76, CRP 1  2/10 chol 143,trig 154,hdl 45,ldl 67 on lipitor 40 mg  8/11 chol 148,trig 141,hdl 52,ldl 68 on lipitor 40 mg  8/12 chol 122,trig 136,hdl 40,ldl 55, decrease  lipitor to 20 mg (40 mg 1/2 per day)  9/26/13 chol 142,trig 124,hdl 50,ldl 67 on lipitor 20 mg (1/2 40 mg tab)  9/16/14 off lipitor no side effects, just wanted to stop the lipitor  9/18/14 chol 229,trig 140,hdl 46,ldl 155 off lipitor; resume lipitor 20 mg daily repeat lipid in 6 weeks  10/16/15 chol 146,trig 160,hdl 51,ldl 63 on lipitor 20 mg  9/24/16 chol 152,trig 167,hdl 51,ldl 68 on lipitor 20 mg  9/30/17 chol 144,trig 138,hdl 52,ldl 64 on lipitor 20 mg  10/6/18 chol 30,trg 144,hdl 47,ldl 54 on lipitor 20 mg  10/18/19 chol 140,trig 153,hdl 49,ldl 60 on lipitor 20 mg  10/23/20 chol 144,trig 145,hdl 47,ldl 72 on lipitor 20 mg  10/21/21 chol 152,trig 151,hdl 51,ldl 75 on lipitor 20 mg  10/28/22 chol 184,trig 128,hdl 54,ldl 107 on lipitor 20 mg         Patient Active Problem List   Diagnosis    Hypertension    Dyslipidemia    Family history of breast cancer    Preventative health care    Tear meniscus knee    Tubular adenoma of colon    Shoulder pain    History of COVID-19       ROS:    Ostomy or other tubes or amputations no  Chronic oxygen use no     Gait: normal. Uses assistive device :  no       Depression Screening  Little interest or pleasure in doing things?  0 - not at all  Feeling down, depressed , or hopeless? 0 - not at all  Patient Health Questionnaire Score: 0     If depressive symptoms identified deferred to follow up visit unless specifically addressed in assessment and plan.    Interpretation of PHQ-9 Total Score   Score Severity   1-4 No Depression   5-9 Mild Depression   10-14 Moderate Depression   15-19 Moderately Severe Depression   20-27 Severe Depression    Screening for Cognitive Impairment  Do you or any of your friends or family members have any concern about your memory? No  Three Minute Recall (Banana, Sunrise, Chair) 2/3    Jeovany clock face with all 12 numbers and set the hands to show 20 past 8.  Yes    Cognitive concerns identified deferred for follow up unless specifically addressed in  assessment and plan.    Fall Risk Assessment  Has the patient had two or more falls in the last year or any fall with injury in the last year?  No    Safety Assessment  Do you always wear your seatbelt?  Yes  Any changes to home needed to function safely? No  Difficulty hearing.  No  Patient counseled about all safety risks that were identified.    Functional Assessment ADLs  Are there any barriers preventing you from cooking for yourself or meeting nutritional needs?  No.    Are there any barriers preventing you from driving safely or obtaining transportation?  No.    Are there any barriers preventing you from using a telephone or calling for help?  No    Are there any barriers preventing you from shopping?  No.    Are there any barriers preventing you from taking care of your own finances?  No    Are there any barriers preventing you from managing your medications?  No    Are there any barriers preventing you from showering, bathing or dressing yourself? No    Are there any barriers preventing you from doing housework or laundry? No  Are there any barriers preventing you from using the toilet?No  Are you currently engaging in any exercise or physical activity?  Yes.      Self-Assessment of Health  What is your perception of your health? Good  Do you sleep more than six hours a night? Yes  In the past 7 days, how much did pain keep you from doing your normal work? None  Do you spend quality time with family or friends (virtually or in person)? Yes  Do you usually eat a heart healthy diet that constists of a variety of fruits, vegetables, whole grains and fiber? Yes  Do you eat foods high in fat and/or Fast Food more than three times per week? No    Advance Care Planning  Do you have an Advance Directive, Living Will, Durable Power of , or POLST?  no                               Patient Care Team:  Adeel Sepulveda M.D. as PCP - General  Blade Verduzco M.D. as Consulting Physician (Ophthalmology)  Helene  ABHAY Fox M.D. as Consulting Physician (Obstetrics & Gynecology)      Health Care Screening recommendations reviewed with patient today and updated or ordered.  DPA/Advanced directive: Completed/Information provided.   Referrals for PT/OT/Nutrition counseling/Behavioral Health/Smoking cessation as above if indicated  Discussion today about general wellness and lifestyle habits:    Prevent falls and reduce trip hazards;   Have a working fire alarm and carbon monoxide detector;   Engage in regular physical activity and social activities;   Use sun protection when outdoors.       ROS           Objective          Physical Exam  Vitals and nursing note reviewed.   Constitutional:       Appearance: Normal appearance.   HENT:      Head: Normocephalic and atraumatic.      Right Ear: External ear normal.      Left Ear: External ear normal.   Eyes:      Conjunctiva/sclera: Conjunctivae normal.   Cardiovascular:      Rate and Rhythm: Normal rate and regular rhythm.      Heart sounds: Normal heart sounds.   Pulmonary:      Effort: No respiratory distress.      Breath sounds: Normal breath sounds.   Abdominal:      General: There is no distension.   Musculoskeletal:         General: No swelling.   Skin:     Coloration: Skin is not jaundiced.      Findings: No bruising.   Neurological:      General: No focal deficit present.      Mental Status: She is alert.   Psychiatric:         Mood and Affect: Mood normal.         Behavior: Behavior normal.                             Assessment & Plan     Assessment  #1 Medicare wellness assessment    #2 Hypertension 10/28/22 urine mac<3.0 on altace 10 mg bid and hctz 12.5 mg stable blood pressure    #3 dyslipidemia 10/28/22 chol 184,trig 128,hdl 54,ldl 107 on lipitor 20 mg no previous muscle aches or muscle pains with statin therapy     #4 postmenopausal bone loss due for bone density test    #5 history of vitamin D deficiency on supplementation    #6 [facility] has had her flu  vaccine but not the COVID-vaccine, RSV or shingles    #7 impaired glucose metabolism    Plan  #1 health maintenance reviewed and updated    #2 she can get the COVID, RSV, shingles vaccine at her pharmacy    #3 Labs    #4 mammogram and bone density ordered for after December 17    #5 continue medications no changes, check blood pressure periodically, continue a good exercise and nutrition program    #6 advised to work on advanced directive planning, and bring that to us when completed so that we can have a copy    #7 follow-up 1 year

## 2023-10-26 ENCOUNTER — TELEPHONE (OUTPATIENT)
Dept: MEDICAL GROUP | Facility: MEDICAL CENTER | Age: 73
End: 2023-10-26
Payer: MEDICARE

## 2023-10-26 PROBLEM — R73.09 IMPAIRED GLUCOSE METABOLISM: Status: ACTIVE | Noted: 2023-10-26

## 2023-10-26 LAB
25(OH)D3+25(OH)D2 SERPL-MCNC: 62.1 NG/ML (ref 30–100)
ALBUMIN SERPL-MCNC: 4 G/DL (ref 3.8–4.8)
ALBUMIN/CREAT UR: 6 MG/G CREAT (ref 0–29)
ALBUMIN/GLOB SERPL: 1.3 {RATIO} (ref 1.2–2.2)
ALP SERPL-CCNC: 86 IU/L (ref 44–121)
ALT SERPL-CCNC: 17 IU/L (ref 0–32)
APPEARANCE UR: ABNORMAL
AST SERPL-CCNC: 22 IU/L (ref 0–40)
BACTERIA #/AREA URNS HPF: ABNORMAL /[HPF]
BACTERIA UR CULT: NO GROWTH
BACTERIA UR CULT: NORMAL
BASOPHILS # BLD AUTO: 0 X10E3/UL (ref 0–0.2)
BASOPHILS NFR BLD AUTO: 1 %
BILIRUB SERPL-MCNC: 0.6 MG/DL (ref 0–1.2)
BILIRUB UR QL STRIP: NEGATIVE
BUN SERPL-MCNC: 11 MG/DL (ref 8–27)
BUN/CREAT SERPL: 11 (ref 12–28)
CALCIUM SERPL-MCNC: 9.7 MG/DL (ref 8.7–10.3)
CASTS URNS MICRO: ABNORMAL
CASTS URNS QL MICRO: ABNORMAL /LPF
CHLORIDE SERPL-SCNC: 103 MMOL/L (ref 96–106)
CHOLEST SERPL-MCNC: 161 MG/DL (ref 100–199)
CO2 SERPL-SCNC: 22 MMOL/L (ref 20–29)
COLOR UR: YELLOW
CREAT SERPL-MCNC: 0.97 MG/DL (ref 0.57–1)
CREAT UR-MCNC: 104.5 MG/DL
CRYSTALS URNS MICRO: ABNORMAL
EGFRCR SERPLBLD CKD-EPI 2021: 61 ML/MIN/1.73
EOSINOPHIL # BLD AUTO: 0.1 X10E3/UL (ref 0–0.4)
EOSINOPHIL NFR BLD AUTO: 3 %
EPI CELLS #/AREA URNS HPF: >10 /HPF (ref 0–10)
ERYTHROCYTE [DISTWIDTH] IN BLOOD BY AUTOMATED COUNT: 12.9 % (ref 11.7–15.4)
GLOBULIN SER CALC-MCNC: 3.2 G/DL (ref 1.5–4.5)
GLUCOSE SERPL-MCNC: 89 MG/DL (ref 70–99)
GLUCOSE UR QL STRIP: NEGATIVE
HBA1C MFR BLD: 6.1 % (ref 4.8–5.6)
HCT VFR BLD AUTO: 40 % (ref 34–46.6)
HDLC SERPL-MCNC: 55 MG/DL
HGB BLD-MCNC: 13.6 G/DL (ref 11.1–15.9)
HGB UR QL STRIP: NEGATIVE
IMM GRANULOCYTES # BLD AUTO: 0 X10E3/UL (ref 0–0.1)
IMM GRANULOCYTES NFR BLD AUTO: 0 %
IMMATURE CELLS  115398: NORMAL
KETONES UR QL STRIP: NEGATIVE
LABORATORY COMMENT REPORT: ABNORMAL
LDLC SERPL CALC-MCNC: 78 MG/DL (ref 0–99)
LEUKOCYTE ESTERASE UR QL STRIP: ABNORMAL
LYMPHOCYTES # BLD AUTO: 1.9 X10E3/UL (ref 0.7–3.1)
LYMPHOCYTES NFR BLD AUTO: 34 %
MCH RBC QN AUTO: 29.7 PG (ref 26.6–33)
MCHC RBC AUTO-ENTMCNC: 34 G/DL (ref 31.5–35.7)
MCV RBC AUTO: 87 FL (ref 79–97)
MICRO URNS: ABNORMAL
MICRO URNS: ABNORMAL
MICROALBUMIN UR-MCNC: 6.5 UG/ML
MONOCYTES # BLD AUTO: 0.5 X10E3/UL (ref 0.1–0.9)
MONOCYTES NFR BLD AUTO: 9 %
MORPHOLOGY BLD-IMP: NORMAL
MUCOUS THREADS URNS QL MICRO: ABNORMAL
NEUTROPHILS # BLD AUTO: 2.9 X10E3/UL (ref 1.4–7)
NEUTROPHILS NFR BLD AUTO: 53 %
NITRITE UR QL STRIP: NEGATIVE
NRBC BLD AUTO-RTO: NORMAL %
PH UR STRIP: 5.5 [PH] (ref 5–7.5)
PLATELET # BLD AUTO: 281 X10E3/UL (ref 150–450)
POTASSIUM SERPL-SCNC: 4.4 MMOL/L (ref 3.5–5.2)
PROT SERPL-MCNC: 7.2 G/DL (ref 6–8.5)
PROT UR QL STRIP: NEGATIVE
RBC # BLD AUTO: 4.58 X10E6/UL (ref 3.77–5.28)
RBC #/AREA URNS HPF: ABNORMAL /HPF (ref 0–2)
RENAL EPI CELLS #/AREA URNS HPF: ABNORMAL /[HPF]
SODIUM SERPL-SCNC: 137 MMOL/L (ref 134–144)
SP GR UR STRIP: 1.01 (ref 1–1.03)
T VAGINALIS URNS QL MICRO: ABNORMAL
TRIGL SERPL-MCNC: 165 MG/DL (ref 0–149)
TSH SERPL DL<=0.005 MIU/L-ACNC: 2.44 UIU/ML (ref 0.45–4.5)
UNIDENT CRYS URNS QL MICRO: ABNORMAL
URINALYSIS REFLEX  377202: ABNORMAL
URNS CMNT MICRO: ABNORMAL
UROBILINOGEN UR STRIP-MCNC: 0.2 MG/DL (ref 0.2–1)
VLDLC SERPL CALC-MCNC: 28 MG/DL (ref 5–40)
WBC # BLD AUTO: 5.5 X10E3/UL (ref 3.4–10.8)
WBC #/AREA URNS HPF: ABNORMAL /HPF (ref 0–5)
YEAST #/AREA URNS HPF: ABNORMAL /[HPF]

## 2023-10-27 NOTE — TELEPHONE ENCOUNTER
Notified with labs, urinalysis culture negative, lipid panel improved, A1c slightly increased, she will continue to monitor carbohydrate portion serving sizes and sweets.  No changes with the current medication regimen.  Continue Lipitor

## 2023-11-29 ENCOUNTER — PATIENT MESSAGE (OUTPATIENT)
Dept: HEALTH INFORMATION MANAGEMENT | Facility: OTHER | Age: 73
End: 2023-11-29

## 2023-12-19 ENCOUNTER — HOSPITAL ENCOUNTER (OUTPATIENT)
Dept: RADIOLOGY | Facility: MEDICAL CENTER | Age: 73
End: 2023-12-19
Attending: INTERNAL MEDICINE
Payer: MEDICARE

## 2023-12-19 ENCOUNTER — TELEPHONE (OUTPATIENT)
Dept: MEDICAL GROUP | Facility: MEDICAL CENTER | Age: 73
End: 2023-12-19

## 2023-12-19 DIAGNOSIS — M81.0 POSTMENOPAUSAL BONE LOSS: ICD-10-CM

## 2023-12-19 DIAGNOSIS — Z12.31 ENCOUNTER FOR SCREENING MAMMOGRAM FOR BREAST CANCER: ICD-10-CM

## 2023-12-19 PROBLEM — M85.80 OSTEOPENIA: Status: ACTIVE | Noted: 2023-12-19

## 2023-12-19 PROCEDURE — 77063 BREAST TOMOSYNTHESIS BI: CPT

## 2023-12-19 PROCEDURE — 77080 DXA BONE DENSITY AXIAL: CPT

## 2023-12-20 ENCOUNTER — TELEPHONE (OUTPATIENT)
Dept: MEDICAL GROUP | Facility: MEDICAL CENTER | Age: 73
End: 2023-12-20
Payer: MEDICARE

## 2023-12-20 NOTE — TELEPHONE ENCOUNTER
Notified with bone density, osteopenia improved on her lumbar spine, normal bone strength at her hip, continue regular exercise, vitamin D, she can take up to 1000 mg of calcium daily.  Repeat bone density 2 to 3 years.

## 2023-12-21 NOTE — TELEPHONE ENCOUNTER
Please notify the patient that:  (1) her mammogram is negative but does show dense breast tissue which may decrease the accuracy of the mammogram  (2) she may obtain a screening breast ultrasound which could provide further detail  (3) [facility] offers the screening breast ultrasound, however some insurance plans may not cover the screening breast ultrasound  (4) if the screening breast ultrasound is not covered, typically the out-of-pocket expense is approximately $125  (5) if she would like me to order the screening breast ultrasound let me know, and I can submit that order to [facility], otherwise we can repeat the screening mammogram in 1 year

## 2024-04-12 PROBLEM — Z98.890 HISTORY OF VITRECTOMY: Status: ACTIVE | Noted: 2024-04-12

## 2024-09-29 DIAGNOSIS — E78.5 DYSLIPIDEMIA: ICD-10-CM

## 2024-09-29 DIAGNOSIS — I10 ESSENTIAL HYPERTENSION: ICD-10-CM

## 2024-09-30 RX ORDER — ATORVASTATIN CALCIUM 20 MG/1
20 TABLET, FILM COATED ORAL DAILY
Qty: 90 TABLET | Refills: 0 | Status: SHIPPED | OUTPATIENT
Start: 2024-09-30

## 2024-09-30 RX ORDER — HYDROCHLOROTHIAZIDE 12.5 MG/1
12.5 CAPSULE ORAL DAILY
Qty: 90 CAPSULE | Refills: 0 | Status: SHIPPED | OUTPATIENT
Start: 2024-09-30

## 2024-09-30 RX ORDER — RAMIPRIL 10 MG/1
10 CAPSULE ORAL 2 TIMES DAILY
Qty: 180 CAPSULE | Refills: 0 | Status: SHIPPED | OUTPATIENT
Start: 2024-09-30

## 2024-10-20 DIAGNOSIS — Z80.3 FAMILY HISTORY OF MALIGNANT NEOPLASM OF BREAST: Chronic | ICD-10-CM

## 2024-10-20 DIAGNOSIS — R73.09 IMPAIRED GLUCOSE METABOLISM: ICD-10-CM

## 2024-10-20 RX ORDER — KETOROLAC TROMETHAMINE 5 MG/ML
SOLUTION OPHTHALMIC
COMMUNITY
Start: 2024-09-12

## 2024-10-20 RX ORDER — BRINZOLAMIDE 10 MG/ML
SUSPENSION/ DROPS OPHTHALMIC
COMMUNITY
Start: 2024-09-24 | End: 2024-10-24

## 2024-10-20 RX ORDER — DORZOLAMIDE HYDROCHLORIDE AND TIMOLOL MALEATE 20; 5 MG/ML; MG/ML
SOLUTION/ DROPS OPHTHALMIC
COMMUNITY
Start: 2024-09-20

## 2024-10-21 SDOH — ECONOMIC STABILITY: FOOD INSECURITY: WITHIN THE PAST 12 MONTHS, THE FOOD YOU BOUGHT JUST DIDN'T LAST AND YOU DIDN'T HAVE MONEY TO GET MORE.: NEVER TRUE

## 2024-10-21 SDOH — HEALTH STABILITY: PHYSICAL HEALTH: ON AVERAGE, HOW MANY MINUTES DO YOU ENGAGE IN EXERCISE AT THIS LEVEL?: 20 MIN

## 2024-10-21 SDOH — HEALTH STABILITY: PHYSICAL HEALTH: ON AVERAGE, HOW MANY DAYS PER WEEK DO YOU ENGAGE IN MODERATE TO STRENUOUS EXERCISE (LIKE A BRISK WALK)?: 4 DAYS

## 2024-10-21 SDOH — ECONOMIC STABILITY: INCOME INSECURITY: IN THE LAST 12 MONTHS, WAS THERE A TIME WHEN YOU WERE NOT ABLE TO PAY THE MORTGAGE OR RENT ON TIME?: NO

## 2024-10-21 SDOH — ECONOMIC STABILITY: FOOD INSECURITY: WITHIN THE PAST 12 MONTHS, YOU WORRIED THAT YOUR FOOD WOULD RUN OUT BEFORE YOU GOT MONEY TO BUY MORE.: NEVER TRUE

## 2024-10-21 SDOH — ECONOMIC STABILITY: INCOME INSECURITY: HOW HARD IS IT FOR YOU TO PAY FOR THE VERY BASICS LIKE FOOD, HOUSING, MEDICAL CARE, AND HEATING?: NOT HARD AT ALL

## 2024-10-21 ASSESSMENT — SOCIAL DETERMINANTS OF HEALTH (SDOH)
IN A TYPICAL WEEK, HOW MANY TIMES DO YOU TALK ON THE PHONE WITH FAMILY, FRIENDS, OR NEIGHBORS?: ONCE A WEEK
HOW OFTEN DO YOU ATTEND CHURCH OR RELIGIOUS SERVICES?: MORE THAN 4 TIMES PER YEAR
IN A TYPICAL WEEK, HOW MANY TIMES DO YOU TALK ON THE PHONE WITH FAMILY, FRIENDS, OR NEIGHBORS?: ONCE A WEEK
HOW OFTEN DO YOU GET TOGETHER WITH FRIENDS OR RELATIVES?: ONCE A WEEK
HOW OFTEN DO YOU ATTEND CHURCH OR RELIGIOUS SERVICES?: MORE THAN 4 TIMES PER YEAR
IN THE PAST 12 MONTHS, HAS THE ELECTRIC, GAS, OIL, OR WATER COMPANY THREATENED TO SHUT OFF SERVICE IN YOUR HOME?: NO
HOW HARD IS IT FOR YOU TO PAY FOR THE VERY BASICS LIKE FOOD, HOUSING, MEDICAL CARE, AND HEATING?: NOT HARD AT ALL
DO YOU BELONG TO ANY CLUBS OR ORGANIZATIONS SUCH AS CHURCH GROUPS UNIONS, FRATERNAL OR ATHLETIC GROUPS, OR SCHOOL GROUPS?: YES
DO YOU BELONG TO ANY CLUBS OR ORGANIZATIONS SUCH AS CHURCH GROUPS UNIONS, FRATERNAL OR ATHLETIC GROUPS, OR SCHOOL GROUPS?: YES
HOW OFTEN DO YOU HAVE SIX OR MORE DRINKS ON ONE OCCASION: NEVER
HOW OFTEN DO YOU HAVE A DRINK CONTAINING ALCOHOL: NEVER
HOW OFTEN DO YOU GET TOGETHER WITH FRIENDS OR RELATIVES?: ONCE A WEEK
HOW OFTEN DO YOU ATTENT MEETINGS OF THE CLUB OR ORGANIZATION YOU BELONG TO?: MORE THAN 4 TIMES PER YEAR
HOW MANY DRINKS CONTAINING ALCOHOL DO YOU HAVE ON A TYPICAL DAY WHEN YOU ARE DRINKING: PATIENT DOES NOT DRINK
WITHIN THE PAST 12 MONTHS, YOU WORRIED THAT YOUR FOOD WOULD RUN OUT BEFORE YOU GOT THE MONEY TO BUY MORE: NEVER TRUE
HOW OFTEN DO YOU ATTENT MEETINGS OF THE CLUB OR ORGANIZATION YOU BELONG TO?: MORE THAN 4 TIMES PER YEAR

## 2024-10-21 ASSESSMENT — LIFESTYLE VARIABLES
SKIP TO QUESTIONS 9-10: 1
HOW OFTEN DO YOU HAVE A DRINK CONTAINING ALCOHOL: NEVER
HOW MANY STANDARD DRINKS CONTAINING ALCOHOL DO YOU HAVE ON A TYPICAL DAY: PATIENT DOES NOT DRINK
AUDIT-C TOTAL SCORE: 0
HOW OFTEN DO YOU HAVE SIX OR MORE DRINKS ON ONE OCCASION: NEVER

## 2024-10-24 ENCOUNTER — TELEPHONE (OUTPATIENT)
Dept: MEDICAL GROUP | Facility: MEDICAL CENTER | Age: 74
End: 2024-10-24

## 2024-10-24 ENCOUNTER — APPOINTMENT (OUTPATIENT)
Dept: MEDICAL GROUP | Facility: MEDICAL CENTER | Age: 74
End: 2024-10-24
Payer: MEDICARE

## 2024-10-24 VITALS
DIASTOLIC BLOOD PRESSURE: 68 MMHG | HEIGHT: 69 IN | WEIGHT: 181.3 LBS | TEMPERATURE: 97.2 F | OXYGEN SATURATION: 98 % | SYSTOLIC BLOOD PRESSURE: 118 MMHG | BODY MASS INDEX: 26.85 KG/M2 | HEART RATE: 70 BPM

## 2024-10-24 DIAGNOSIS — E78.5 DYSLIPIDEMIA: Chronic | ICD-10-CM

## 2024-10-24 DIAGNOSIS — Z12.31 ENCOUNTER FOR SCREENING MAMMOGRAM FOR BREAST CANCER: ICD-10-CM

## 2024-10-24 DIAGNOSIS — I10 PRIMARY HYPERTENSION: Chronic | ICD-10-CM

## 2024-10-24 DIAGNOSIS — E55.9 VITAMIN D DEFICIENCY: ICD-10-CM

## 2024-10-24 DIAGNOSIS — R73.09 IMPAIRED GLUCOSE METABOLISM: ICD-10-CM

## 2024-10-24 DIAGNOSIS — Z00.00 MEDICARE ANNUAL WELLNESS VISIT, SUBSEQUENT: ICD-10-CM

## 2024-10-24 DIAGNOSIS — Z00.00 PREVENTATIVE HEALTH CARE: Chronic | ICD-10-CM

## 2024-10-24 PROCEDURE — G0439 PPPS, SUBSEQ VISIT: HCPCS | Performed by: INTERNAL MEDICINE

## 2024-10-24 PROCEDURE — 3074F SYST BP LT 130 MM HG: CPT | Performed by: INTERNAL MEDICINE

## 2024-10-24 PROCEDURE — 3078F DIAST BP <80 MM HG: CPT | Performed by: INTERNAL MEDICINE

## 2024-10-24 ASSESSMENT — ENCOUNTER SYMPTOMS: GENERAL WELL-BEING: GOOD

## 2024-10-24 ASSESSMENT — PATIENT HEALTH QUESTIONNAIRE - PHQ9: CLINICAL INTERPRETATION OF PHQ2 SCORE: 0

## 2024-10-24 ASSESSMENT — FIBROSIS 4 INDEX: FIB4 SCORE: 1.42

## 2024-10-24 ASSESSMENT — ACTIVITIES OF DAILY LIVING (ADL): BATHING_REQUIRES_ASSISTANCE: 0

## 2024-10-26 ENCOUNTER — HOSPITAL ENCOUNTER (OUTPATIENT)
Dept: LAB | Facility: MEDICAL CENTER | Age: 74
End: 2024-10-26
Attending: INTERNAL MEDICINE
Payer: MEDICARE

## 2024-10-26 DIAGNOSIS — E55.9 VITAMIN D DEFICIENCY: ICD-10-CM

## 2024-10-26 DIAGNOSIS — I10 ESSENTIAL HYPERTENSION: ICD-10-CM

## 2024-10-26 DIAGNOSIS — E78.5 DYSLIPIDEMIA: Chronic | ICD-10-CM

## 2024-10-26 DIAGNOSIS — E78.5 DYSLIPIDEMIA: ICD-10-CM

## 2024-10-26 DIAGNOSIS — I10 PRIMARY HYPERTENSION: Chronic | ICD-10-CM

## 2024-10-26 DIAGNOSIS — R73.09 IMPAIRED GLUCOSE METABOLISM: ICD-10-CM

## 2024-10-26 LAB
25(OH)D3 SERPL-MCNC: 67 NG/ML (ref 30–100)
ALBUMIN SERPL BCP-MCNC: 3.9 G/DL (ref 3.2–4.9)
ALBUMIN/GLOB SERPL: 1.2 G/DL
ALP SERPL-CCNC: 87 U/L (ref 30–99)
ALT SERPL-CCNC: 13 U/L (ref 2–50)
AMORPH CRY #/AREA URNS HPF: PRESENT /HPF
ANION GAP SERPL CALC-SCNC: 10 MMOL/L (ref 7–16)
APPEARANCE UR: CLEAR
AST SERPL-CCNC: 18 U/L (ref 12–45)
BACTERIA #/AREA URNS HPF: ABNORMAL /HPF
BASOPHILS # BLD AUTO: 0.6 % (ref 0–1.8)
BASOPHILS # BLD: 0.04 K/UL (ref 0–0.12)
BILIRUB SERPL-MCNC: 0.7 MG/DL (ref 0.1–1.5)
BILIRUB UR QL STRIP.AUTO: NEGATIVE
BUN SERPL-MCNC: 15 MG/DL (ref 8–22)
CALCIUM ALBUM COR SERPL-MCNC: 9.8 MG/DL (ref 8.5–10.5)
CALCIUM SERPL-MCNC: 9.7 MG/DL (ref 8.4–10.2)
CHLORIDE SERPL-SCNC: 103 MMOL/L (ref 96–112)
CHOLEST SERPL-MCNC: 162 MG/DL (ref 100–199)
CO2 SERPL-SCNC: 25 MMOL/L (ref 20–33)
COLOR UR: YELLOW
CREAT SERPL-MCNC: 0.9 MG/DL (ref 0.5–1.4)
CREAT UR-MCNC: 88.7 MG/DL
EOSINOPHIL # BLD AUTO: 0.13 K/UL (ref 0–0.51)
EOSINOPHIL NFR BLD: 1.8 % (ref 0–6.9)
EPI CELLS #/AREA URNS HPF: ABNORMAL /HPF
ERYTHROCYTE [DISTWIDTH] IN BLOOD BY AUTOMATED COUNT: 39.5 FL (ref 35.9–50)
EST. AVERAGE GLUCOSE BLD GHB EST-MCNC: 126 MG/DL
FASTING STATUS PATIENT QL REPORTED: NORMAL
GFR SERPLBLD CREATININE-BSD FMLA CKD-EPI: 67 ML/MIN/1.73 M 2
GLOBULIN SER CALC-MCNC: 3.3 G/DL (ref 1.9–3.5)
GLUCOSE SERPL-MCNC: 94 MG/DL (ref 65–99)
GLUCOSE UR STRIP.AUTO-MCNC: NEGATIVE MG/DL
HBA1C MFR BLD: 6 % (ref 4–5.6)
HCT VFR BLD AUTO: 40.1 % (ref 37–47)
HDLC SERPL-MCNC: 53 MG/DL
HGB BLD-MCNC: 13.6 G/DL (ref 12–16)
IMM GRANULOCYTES # BLD AUTO: 0.02 K/UL (ref 0–0.11)
IMM GRANULOCYTES NFR BLD AUTO: 0.3 % (ref 0–0.9)
KETONES UR STRIP.AUTO-MCNC: NEGATIVE MG/DL
LDLC SERPL CALC-MCNC: 87 MG/DL
LEUKOCYTE ESTERASE UR QL STRIP.AUTO: ABNORMAL
LYMPHOCYTES # BLD AUTO: 1.82 K/UL (ref 1–4.8)
LYMPHOCYTES NFR BLD: 25.8 % (ref 22–41)
MCH RBC QN AUTO: 29.9 PG (ref 27–33)
MCHC RBC AUTO-ENTMCNC: 33.9 G/DL (ref 32.2–35.5)
MCV RBC AUTO: 88.1 FL (ref 81.4–97.8)
MICRO URNS: ABNORMAL
MICROALBUMIN UR-MCNC: <1.2 MG/DL
MICROALBUMIN/CREAT UR: NORMAL MG/G (ref 0–30)
MONOCYTES # BLD AUTO: 0.59 K/UL (ref 0–0.85)
MONOCYTES NFR BLD AUTO: 8.4 % (ref 0–13.4)
MUCOUS THREADS #/AREA URNS HPF: ABNORMAL /HPF
NEUTROPHILS # BLD AUTO: 4.45 K/UL (ref 1.82–7.42)
NEUTROPHILS NFR BLD: 63.1 % (ref 44–72)
NITRITE UR QL STRIP.AUTO: NEGATIVE
NRBC # BLD AUTO: 0 K/UL
NRBC BLD-RTO: 0 /100 WBC (ref 0–0.2)
PH UR STRIP.AUTO: 6 [PH] (ref 5–8)
PLATELET # BLD AUTO: 251 K/UL (ref 164–446)
PMV BLD AUTO: 10.4 FL (ref 9–12.9)
POTASSIUM SERPL-SCNC: 4.4 MMOL/L (ref 3.6–5.5)
PROT SERPL-MCNC: 7.2 G/DL (ref 6–8.2)
PROT UR QL STRIP: NEGATIVE MG/DL
RBC # BLD AUTO: 4.55 M/UL (ref 4.2–5.4)
RBC # URNS HPF: ABNORMAL /HPF
RBC UR QL AUTO: NEGATIVE
SODIUM SERPL-SCNC: 138 MMOL/L (ref 135–145)
SP GR UR STRIP.AUTO: 1.02
TRIGL SERPL-MCNC: 109 MG/DL (ref 0–149)
TSH SERPL DL<=0.005 MIU/L-ACNC: 1.94 UIU/ML (ref 0.38–5.33)
WBC # BLD AUTO: 7.1 K/UL (ref 4.8–10.8)
WBC #/AREA URNS HPF: ABNORMAL /HPF

## 2024-10-26 PROCEDURE — 85025 COMPLETE CBC W/AUTO DIFF WBC: CPT

## 2024-10-26 PROCEDURE — 80061 LIPID PANEL: CPT

## 2024-10-26 PROCEDURE — 81001 URINALYSIS AUTO W/SCOPE: CPT

## 2024-10-26 PROCEDURE — 36415 COLL VENOUS BLD VENIPUNCTURE: CPT

## 2024-10-26 PROCEDURE — 82570 ASSAY OF URINE CREATININE: CPT

## 2024-10-26 PROCEDURE — 80053 COMPREHEN METABOLIC PANEL: CPT

## 2024-10-26 PROCEDURE — 84443 ASSAY THYROID STIM HORMONE: CPT

## 2024-10-26 PROCEDURE — 83036 HEMOGLOBIN GLYCOSYLATED A1C: CPT | Mod: GA

## 2024-10-26 PROCEDURE — 82306 VITAMIN D 25 HYDROXY: CPT

## 2024-10-26 PROCEDURE — 82043 UR ALBUMIN QUANTITATIVE: CPT

## 2024-10-27 ENCOUNTER — TELEPHONE (OUTPATIENT)
Dept: MEDICAL GROUP | Facility: MEDICAL CENTER | Age: 74
End: 2024-10-27
Payer: MEDICARE

## 2024-10-27 RX ORDER — RAMIPRIL 10 MG/1
10 CAPSULE ORAL 2 TIMES DAILY
Qty: 180 CAPSULE | Refills: 3 | Status: SHIPPED | OUTPATIENT
Start: 2024-10-27

## 2024-10-27 RX ORDER — ATORVASTATIN CALCIUM 20 MG/1
20 TABLET, FILM COATED ORAL DAILY
Qty: 90 TABLET | Refills: 3 | Status: SHIPPED | OUTPATIENT
Start: 2024-10-27

## 2024-10-27 RX ORDER — HYDROCHLOROTHIAZIDE 12.5 MG/1
12.5 CAPSULE ORAL DAILY
Qty: 90 CAPSULE | Refills: 3 | Status: SHIPPED | OUTPATIENT
Start: 2024-10-27

## 2024-12-23 ENCOUNTER — HOSPITAL ENCOUNTER (OUTPATIENT)
Dept: RADIOLOGY | Facility: MEDICAL CENTER | Age: 74
End: 2024-12-23
Attending: INTERNAL MEDICINE
Payer: MEDICARE

## 2024-12-23 DIAGNOSIS — Z12.31 ENCOUNTER FOR SCREENING MAMMOGRAM FOR BREAST CANCER: ICD-10-CM

## 2024-12-23 PROCEDURE — 77067 SCR MAMMO BI INCL CAD: CPT

## 2024-12-27 ENCOUNTER — TELEPHONE (OUTPATIENT)
Dept: MEDICAL GROUP | Facility: MEDICAL CENTER | Age: 74
End: 2024-12-27
Payer: MEDICARE

## 2024-12-27 NOTE — TELEPHONE ENCOUNTER
Please notify the patient that:  (1) her mammogram is negative but does show dense breast tissue which may decrease the accuracy of the mammogram  (2) she may obtain a screening breast ultrasound which could provide further detail  (3) [facility] offers the screening breast ultrasound, however some insurance plans may not cover the screening breast ultrasound  (4) if the screening breast ultrasound is not covered, typically the out-of-pocket expense is approximately $125  (5) if she would like me to order the screening breast ultrasound let me know, and I can submit that order to [facility], otherwise we can repeat the screening mammogram in 1 year